# Patient Record
Sex: FEMALE | Race: WHITE | NOT HISPANIC OR LATINO | URBAN - METROPOLITAN AREA
[De-identification: names, ages, dates, MRNs, and addresses within clinical notes are randomized per-mention and may not be internally consistent; named-entity substitution may affect disease eponyms.]

---

## 2020-05-01 ENCOUNTER — INPATIENT (INPATIENT)
Facility: HOSPITAL | Age: 85
LOS: 4 days | Discharge: OTHER INSTITUTION | DRG: 481 | End: 2020-05-06
Attending: HOSPITALIST | Admitting: INTERNAL MEDICINE
Payer: MEDICARE

## 2020-05-01 VITALS
HEIGHT: 63 IN | OXYGEN SATURATION: 98 % | DIASTOLIC BLOOD PRESSURE: 81 MMHG | RESPIRATION RATE: 17 BRPM | SYSTOLIC BLOOD PRESSURE: 164 MMHG | TEMPERATURE: 98 F | WEIGHT: 110.01 LBS | HEART RATE: 88 BPM

## 2020-05-01 DIAGNOSIS — S62.347A NONDISPLACED FRACTURE OF BASE OF FIFTH METACARPAL BONE, LEFT HAND, INITIAL ENCOUNTER FOR CLOSED FRACTURE: ICD-10-CM

## 2020-05-01 DIAGNOSIS — S72.452A DISPLACED SUPRACONDYLAR FRACTURE WITHOUT INTRACONDYLAR EXTENSION OF LOWER END OF LEFT FEMUR, INITIAL ENCOUNTER FOR CLOSED FRACTURE: ICD-10-CM

## 2020-05-01 DIAGNOSIS — Z96.653 PRESENCE OF ARTIFICIAL KNEE JOINT, BILATERAL: Chronic | ICD-10-CM

## 2020-05-01 DIAGNOSIS — M97.9XXA PERIPROSTHETIC FRACTURE AROUND UNSPECIFIED INTERNAL PROSTHETIC JOINT, INITIAL ENCOUNTER: ICD-10-CM

## 2020-05-01 DIAGNOSIS — Z90.49 ACQUIRED ABSENCE OF OTHER SPECIFIED PARTS OF DIGESTIVE TRACT: Chronic | ICD-10-CM

## 2020-05-01 LAB
ABO RH CONFIRMATION: SIGNIFICANT CHANGE UP
ALBUMIN SERPL ELPH-MCNC: 3.5 G/DL — SIGNIFICANT CHANGE UP (ref 3.3–5)
ALP SERPL-CCNC: 63 U/L — SIGNIFICANT CHANGE UP (ref 40–120)
ALT FLD-CCNC: 15 U/L — SIGNIFICANT CHANGE UP (ref 10–45)
ANION GAP SERPL CALC-SCNC: 7 MMOL/L — SIGNIFICANT CHANGE UP (ref 5–17)
APTT BLD: 28.3 SEC — SIGNIFICANT CHANGE UP (ref 27.5–36.3)
AST SERPL-CCNC: 30 U/L — SIGNIFICANT CHANGE UP (ref 10–40)
BASOPHILS # BLD AUTO: 0.03 K/UL — SIGNIFICANT CHANGE UP (ref 0–0.2)
BASOPHILS NFR BLD AUTO: 0.5 % — SIGNIFICANT CHANGE UP (ref 0–2)
BILIRUB SERPL-MCNC: 0.4 MG/DL — SIGNIFICANT CHANGE UP (ref 0.2–1.2)
BLD GP AB SCN SERPL QL: SIGNIFICANT CHANGE UP
BUN SERPL-MCNC: 12 MG/DL — SIGNIFICANT CHANGE UP (ref 7–23)
CALCIUM SERPL-MCNC: 9.2 MG/DL — SIGNIFICANT CHANGE UP (ref 8.4–10.5)
CHLORIDE SERPL-SCNC: 96 MMOL/L — SIGNIFICANT CHANGE UP (ref 96–108)
CO2 SERPL-SCNC: 28 MMOL/L — SIGNIFICANT CHANGE UP (ref 22–31)
CREAT SERPL-MCNC: 0.74 MG/DL — SIGNIFICANT CHANGE UP (ref 0.5–1.3)
EOSINOPHIL # BLD AUTO: 0.07 K/UL — SIGNIFICANT CHANGE UP (ref 0–0.5)
EOSINOPHIL NFR BLD AUTO: 1.1 % — SIGNIFICANT CHANGE UP (ref 0–6)
GLUCOSE SERPL-MCNC: 115 MG/DL — HIGH (ref 70–99)
HCT VFR BLD CALC: 33.7 % — LOW (ref 34.5–45)
HGB BLD-MCNC: 11.5 G/DL — SIGNIFICANT CHANGE UP (ref 11.5–15.5)
IMM GRANULOCYTES NFR BLD AUTO: 0.3 % — SIGNIFICANT CHANGE UP (ref 0–1.5)
INR BLD: 1.01 RATIO — SIGNIFICANT CHANGE UP (ref 0.88–1.16)
LYMPHOCYTES # BLD AUTO: 0.94 K/UL — LOW (ref 1–3.3)
LYMPHOCYTES # BLD AUTO: 14.2 % — SIGNIFICANT CHANGE UP (ref 13–44)
MCHC RBC-ENTMCNC: 32 PG — SIGNIFICANT CHANGE UP (ref 27–34)
MCHC RBC-ENTMCNC: 34.1 GM/DL — SIGNIFICANT CHANGE UP (ref 32–36)
MCV RBC AUTO: 93.9 FL — SIGNIFICANT CHANGE UP (ref 80–100)
MONOCYTES # BLD AUTO: 0.48 K/UL — SIGNIFICANT CHANGE UP (ref 0–0.9)
MONOCYTES NFR BLD AUTO: 7.3 % — SIGNIFICANT CHANGE UP (ref 2–14)
NEUTROPHILS # BLD AUTO: 5.07 K/UL — SIGNIFICANT CHANGE UP (ref 1.8–7.4)
NEUTROPHILS NFR BLD AUTO: 76.6 % — SIGNIFICANT CHANGE UP (ref 43–77)
NRBC # BLD: 0 /100 WBCS — SIGNIFICANT CHANGE UP (ref 0–0)
PLATELET # BLD AUTO: 182 K/UL — SIGNIFICANT CHANGE UP (ref 150–400)
POTASSIUM SERPL-MCNC: 4 MMOL/L — SIGNIFICANT CHANGE UP (ref 3.5–5.3)
POTASSIUM SERPL-SCNC: 4 MMOL/L — SIGNIFICANT CHANGE UP (ref 3.5–5.3)
PROT SERPL-MCNC: 7.3 G/DL — SIGNIFICANT CHANGE UP (ref 6–8.3)
PROTHROM AB SERPL-ACNC: 11.3 SEC — SIGNIFICANT CHANGE UP (ref 10–12.9)
RBC # BLD: 3.59 M/UL — LOW (ref 3.8–5.2)
RBC # FLD: 12.4 % — SIGNIFICANT CHANGE UP (ref 10.3–14.5)
SARS-COV-2 RNA SPEC QL NAA+PROBE: SIGNIFICANT CHANGE UP
SODIUM SERPL-SCNC: 131 MMOL/L — LOW (ref 135–145)
WBC # BLD: 6.61 K/UL — SIGNIFICANT CHANGE UP (ref 3.8–10.5)
WBC # FLD AUTO: 6.61 K/UL — SIGNIFICANT CHANGE UP (ref 3.8–10.5)

## 2020-05-01 PROCEDURE — 73110 X-RAY EXAM OF WRIST: CPT | Mod: 26,LT

## 2020-05-01 PROCEDURE — 27511 TREATMENT OF THIGH FRACTURE: CPT | Mod: LT

## 2020-05-01 PROCEDURE — 27511 TREATMENT OF THIGH FRACTURE: CPT | Mod: AS

## 2020-05-01 PROCEDURE — 27511 TREATMENT OF THIGH FRACTURE: CPT | Mod: 80,LT

## 2020-05-01 PROCEDURE — 20900 REMOVAL OF BONE FOR GRAFT: CPT | Mod: LT

## 2020-05-01 PROCEDURE — 99285 EMERGENCY DEPT VISIT HI MDM: CPT

## 2020-05-01 PROCEDURE — 73552 X-RAY EXAM OF FEMUR 2/>: CPT | Mod: 26,LT,77

## 2020-05-01 PROCEDURE — 29125 APPL SHORT ARM SPLINT STATIC: CPT | Mod: LT

## 2020-05-01 PROCEDURE — 99223 1ST HOSP IP/OBS HIGH 75: CPT

## 2020-05-01 PROCEDURE — 76000 FLUOROSCOPY <1 HR PHYS/QHP: CPT | Mod: 26

## 2020-05-01 PROCEDURE — 73552 X-RAY EXAM OF FEMUR 2/>: CPT | Mod: 26,LT

## 2020-05-01 PROCEDURE — 93010 ELECTROCARDIOGRAM REPORT: CPT

## 2020-05-01 PROCEDURE — 99221 1ST HOSP IP/OBS SF/LOW 40: CPT | Mod: 57,25

## 2020-05-01 PROCEDURE — 71045 X-RAY EXAM CHEST 1 VIEW: CPT | Mod: 26

## 2020-05-01 PROCEDURE — 72170 X-RAY EXAM OF PELVIS: CPT | Mod: 26

## 2020-05-01 PROCEDURE — 73562 X-RAY EXAM OF KNEE 3: CPT | Mod: 26,LT

## 2020-05-01 RX ORDER — PANTOPRAZOLE SODIUM 20 MG/1
40 TABLET, DELAYED RELEASE ORAL
Refills: 0 | Status: DISCONTINUED | OUTPATIENT
Start: 2020-05-01 | End: 2020-05-06

## 2020-05-01 RX ORDER — TAMSULOSIN HYDROCHLORIDE 0.4 MG/1
1 CAPSULE ORAL
Qty: 0 | Refills: 0 | DISCHARGE

## 2020-05-01 RX ORDER — SENNA PLUS 8.6 MG/1
2 TABLET ORAL AT BEDTIME
Refills: 0 | Status: DISCONTINUED | OUTPATIENT
Start: 2020-05-01 | End: 2020-05-01

## 2020-05-01 RX ORDER — ACETAMINOPHEN 500 MG
650 TABLET ORAL EVERY 6 HOURS
Refills: 0 | Status: DISCONTINUED | OUTPATIENT
Start: 2020-05-01 | End: 2020-05-01

## 2020-05-01 RX ORDER — ONDANSETRON 8 MG/1
4 TABLET, FILM COATED ORAL ONCE
Refills: 0 | Status: DISCONTINUED | OUTPATIENT
Start: 2020-05-01 | End: 2020-05-01

## 2020-05-01 RX ORDER — SODIUM CHLORIDE 9 MG/ML
1000 INJECTION, SOLUTION INTRAVENOUS
Refills: 0 | Status: DISCONTINUED | OUTPATIENT
Start: 2020-05-01 | End: 2020-05-02

## 2020-05-01 RX ORDER — CEFAZOLIN SODIUM 1 G
2000 VIAL (EA) INJECTION EVERY 8 HOURS
Refills: 0 | Status: DISCONTINUED | OUTPATIENT
Start: 2020-05-01 | End: 2020-05-01

## 2020-05-01 RX ORDER — SODIUM CHLORIDE 9 MG/ML
250 INJECTION INTRAMUSCULAR; INTRAVENOUS; SUBCUTANEOUS
Refills: 0 | Status: DISCONTINUED | OUTPATIENT
Start: 2020-05-01 | End: 2020-05-02

## 2020-05-01 RX ORDER — CEFAZOLIN SODIUM 1 G
2000 VIAL (EA) INJECTION EVERY 8 HOURS
Refills: 0 | Status: COMPLETED | OUTPATIENT
Start: 2020-05-02 | End: 2020-05-02

## 2020-05-01 RX ORDER — OXYCODONE HYDROCHLORIDE 5 MG/1
5 TABLET ORAL
Refills: 0 | Status: DISCONTINUED | OUTPATIENT
Start: 2020-05-01 | End: 2020-05-06

## 2020-05-01 RX ORDER — ENOXAPARIN SODIUM 100 MG/ML
30 INJECTION SUBCUTANEOUS DAILY
Refills: 0 | Status: DISCONTINUED | OUTPATIENT
Start: 2020-05-02 | End: 2020-05-06

## 2020-05-01 RX ORDER — ACETAMINOPHEN 500 MG
1000 TABLET ORAL ONCE
Refills: 0 | Status: COMPLETED | OUTPATIENT
Start: 2020-05-02 | End: 2020-05-02

## 2020-05-01 RX ORDER — HYDROMORPHONE HYDROCHLORIDE 2 MG/ML
0.25 INJECTION INTRAMUSCULAR; INTRAVENOUS; SUBCUTANEOUS
Refills: 0 | Status: DISCONTINUED | OUTPATIENT
Start: 2020-05-01 | End: 2020-05-06

## 2020-05-01 RX ORDER — LEVOTHYROXINE SODIUM 125 MCG
25 TABLET ORAL DAILY
Refills: 0 | Status: DISCONTINUED | OUTPATIENT
Start: 2020-05-01 | End: 2020-05-01

## 2020-05-01 RX ORDER — HYDROMORPHONE HYDROCHLORIDE 2 MG/ML
0.5 INJECTION INTRAMUSCULAR; INTRAVENOUS; SUBCUTANEOUS
Refills: 0 | Status: DISCONTINUED | OUTPATIENT
Start: 2020-05-01 | End: 2020-05-01

## 2020-05-01 RX ORDER — ONDANSETRON 8 MG/1
4 TABLET, FILM COATED ORAL EVERY 6 HOURS
Refills: 0 | Status: DISCONTINUED | OUTPATIENT
Start: 2020-05-01 | End: 2020-05-06

## 2020-05-01 RX ORDER — LEVOTHYROXINE SODIUM 125 MCG
1 TABLET ORAL
Qty: 0 | Refills: 0 | DISCHARGE

## 2020-05-01 RX ORDER — MORPHINE SULFATE 50 MG/1
2 CAPSULE, EXTENDED RELEASE ORAL ONCE
Refills: 0 | Status: DISCONTINUED | OUTPATIENT
Start: 2020-05-01 | End: 2020-05-01

## 2020-05-01 RX ORDER — HYDROMORPHONE HYDROCHLORIDE 2 MG/ML
0.25 INJECTION INTRAMUSCULAR; INTRAVENOUS; SUBCUTANEOUS
Refills: 0 | Status: DISCONTINUED | OUTPATIENT
Start: 2020-05-01 | End: 2020-05-01

## 2020-05-01 RX ORDER — HEPARIN SODIUM 5000 [USP'U]/ML
5000 INJECTION INTRAVENOUS; SUBCUTANEOUS EVERY 8 HOURS
Refills: 0 | Status: CANCELLED | OUTPATIENT
Start: 2020-05-02 | End: 2020-05-01

## 2020-05-01 RX ORDER — MORPHINE SULFATE 50 MG/1
2 CAPSULE, EXTENDED RELEASE ORAL EVERY 6 HOURS
Refills: 0 | Status: DISCONTINUED | OUTPATIENT
Start: 2020-05-01 | End: 2020-05-01

## 2020-05-01 RX ORDER — PANTOPRAZOLE SODIUM 20 MG/1
40 TABLET, DELAYED RELEASE ORAL
Refills: 0 | Status: DISCONTINUED | OUTPATIENT
Start: 2020-05-01 | End: 2020-05-01

## 2020-05-01 RX ORDER — POLYETHYLENE GLYCOL 3350 17 G/17G
17 POWDER, FOR SOLUTION ORAL DAILY
Refills: 0 | Status: DISCONTINUED | OUTPATIENT
Start: 2020-05-01 | End: 2020-05-06

## 2020-05-01 RX ORDER — SODIUM CHLORIDE 9 MG/ML
1000 INJECTION, SOLUTION INTRAVENOUS
Refills: 0 | Status: DISCONTINUED | OUTPATIENT
Start: 2020-05-01 | End: 2020-05-01

## 2020-05-01 RX ORDER — SENNA PLUS 8.6 MG/1
1 TABLET ORAL AT BEDTIME
Refills: 0 | Status: DISCONTINUED | OUTPATIENT
Start: 2020-05-02 | End: 2020-05-06

## 2020-05-01 RX ORDER — TAMSULOSIN HYDROCHLORIDE 0.4 MG/1
0.4 CAPSULE ORAL AT BEDTIME
Refills: 0 | Status: DISCONTINUED | OUTPATIENT
Start: 2020-05-01 | End: 2020-05-01

## 2020-05-01 RX ADMIN — Medication 25 MICROGRAM(S): at 06:45

## 2020-05-01 RX ADMIN — MORPHINE SULFATE 2 MILLIGRAM(S): 50 CAPSULE, EXTENDED RELEASE ORAL at 03:25

## 2020-05-01 RX ADMIN — SODIUM CHLORIDE 60 MILLILITER(S): 9 INJECTION INTRAMUSCULAR; INTRAVENOUS; SUBCUTANEOUS at 07:57

## 2020-05-01 RX ADMIN — PANTOPRAZOLE SODIUM 40 MILLIGRAM(S): 20 TABLET, DELAYED RELEASE ORAL at 06:46

## 2020-05-01 NOTE — ED PROVIDER NOTE - CARE PLAN
Principal Discharge DX:	Periprosth fracture around unsp internal prosth joint, init  Secondary Diagnosis:	Femur fracture, left

## 2020-05-01 NOTE — H&P ADULT - NSICDXPASTSURGICALHX_GEN_ALL_CORE_FT
PAST SURGICAL HISTORY:  History of cholecystectomy     History of total bilateral knee replacement (TKR)

## 2020-05-01 NOTE — H&P ADULT - NSHPPHYSICALEXAM_GEN_ALL_CORE
Vital Signs Last 24 Hrs  T(C): 36.6 (01 May 2020 02:01), Max: 36.6 (01 May 2020 02:01)  T(F): 97.9 (01 May 2020 02:01), Max: 97.9 (01 May 2020 02:01)  HR: 82 (01 May 2020 04:10) (82 - 88)  BP: 135/76 (01 May 2020 04:10) (135/76 - 164/81)  BP(mean): 90 (01 May 2020 04:10) (90 - 90)  RR: 16 (01 May 2020 04:10) (16 - 17)  SpO2: 98% (01 May 2020 04:10) (98% - 98%)  Daily Height in cm: 160.02 (01 May 2020 02:01)    Daily   CAPILLARY BLOOD GLUCOSE        I&O's Summary      GENERAL: NAD  HEAD:  Normocephalic  EYES: EOMI, PERRLA, conjunctiva and sclera clear  ENMT: No tonsillar erythema, exudates, or enlargement; Moist mucous membranes, No lesions  NECK: Supple, No JVD, no bruit, normal thyroid  NERVOUS SYSTEM:  Alert & Oriented X3, moves all fours except L leg in splint  CHEST/LUNG: Clear to auscultation bilaterally; No rales, rhonchi, wheezing, or rubs  HEART: Regular rate and rhythm; No murmurs, rubs, or gallops  ABDOMEN: Soft, Nontender, mildly distended; Bowel sounds present  EXTREMITIES:  2+ Peripheral Pulses, No clubbing, cyanosis, or edema  LYMPH: No lymphadenopathy noted  SKIN: No rashes or lesions

## 2020-05-01 NOTE — ED ADULT TRIAGE NOTE - CHIEF COMPLAINT QUOTE
Pt presents to the ED s/p trip and fall, negative LOC, negative head injury, denies thinners. Complains of pain to the left knee, swelling noted, pt also complains of pain to the left wrist. Denies sick contacts

## 2020-05-01 NOTE — ED PROVIDER NOTE - OBJECTIVE STATEMENT
pt s/p mechanical fall at home, lives with daughter and son-in-law show brought pt in because of L knee pain and inability to ambulate or get up after fall.  pt denies any head trauma, confirmed by family.

## 2020-05-01 NOTE — BRIEF OPERATIVE NOTE - NSICDXBRIEFPREOP_GEN_ALL_CORE_FT
PRE-OP DIAGNOSIS:  Periprosthetic supracondylar fracture of femur 01-May-2020 20:11:00 left femur Tiana Rivas

## 2020-05-01 NOTE — CONSULT NOTE ADULT - PROBLEM SELECTOR PROBLEM 1
Closed displaced supracondylar fracture of distal end of left femur without intracondylar extension, initial encounter

## 2020-05-01 NOTE — H&P ADULT - ASSESSMENT
89F hx of CAD/NSTEMI, ?IPF/CHF (not formerly dx with either but takes Lasix once a week), hypothyroid, urinary retention pw s/p mechanical fall with distal L femur fx about L prosthetic knee    # L distal femur fx  pain control  dvt/gi proph  t/s  npo  ortho Dr Hernandez aware.   COVID status pending.  Pt at moderate risk for surgery. Pt with no evidence of active MI/CHF. Pt medically optimized for sx. Son  Guru is aware and gives consent for surgery.     #Hypothyroid  cont synthroid. 89F hx of CAD/NSTEMI, ?IPF/CHF (not formerly dx with either but takes Lasix once a week), hypothyroid, urinary retention pw s/p mechanical fall with distal L femur fx about L prosthetic knee    # L distal femur fx  pain control  dvt/gi proph  t/s  npo  ortho Dr Hernandez aware.   COVID status pending.  Pt at moderate risk for surgery. Pt with no evidence of active MI/CHF. Pt medically optimized for sx. Son  Guru is aware and gives consent for surgery.     #Hypothyroid  cont synthroid.     #CHF  takes Lasix once a week. restart after sx.     # Mild hyponatremia.  likely sec to combination of pain, background diuretic use. 89F hx of CAD/NSTEMI, ?IPF/CHF (not formerly dx with either but takes Lasix once a week), hypothyroid, urinary retention pw s/p mechanical fall with distal L femur fx about L prosthetic knee    # L distal femur fx  pain control  dvt/gi proph  t/s  npo  ortho Dr Hernandez aware.   COVID status pending.  Pt at moderate risk for surgery. Pt with no evidence of active MI/CHF. Pt medically optimized for sx. Son  Guru is aware and gives consent for surgery.   FU L wrist film results - wet read base of L 5th MCP fx.    #Hypothyroid  cont synthroid.     #CHF  takes Lasix once a week. restart after sx.     # Mild hyponatremia.  likely sec to combination of pain, background diuretic use.

## 2020-05-01 NOTE — H&P ADULT - HISTORY OF PRESENT ILLNESS
89F hx of CAD/NSTEMI, ?IPF/CHF (not formerly dx with either but takes Lasix once a week), hypothyroid, urinary retention pw s/p mechanical fall. Pt got up from bed and fell. No antecedent sxs of HA, dizziness, SOB, CP. No recent fevers, chills, cough, NVD. Xray with distal femur fx above the L prosthetic knee. 89F hx of CAD/NSTEMI, ?IPF/CHF (not formerly dx with either but takes Lasix once a week), hypothyroid, urinary retention pw s/p mechanical fall. Pt got up from bed and fell. No antecedent sxs of HA, dizziness, SOB, CP. No recent fevers, chills, cough, NVD. Xray with distal femur fx above the L prosthetic knee.     son Dr Garvey [phone]  son-in-law Dr Wilson [phone]

## 2020-05-01 NOTE — CONSULT NOTE ADULT - SUBJECTIVE AND OBJECTIVE BOX
KATRIN TONY      89y Female with hx of CAD/NSTEMI, ?IPF/CHF (not formerly dx with either but takes Lasix once a week), hypothyroid, urinary retention pw s/p mechanical fall. Pt got up from bed and fell. No antecedent sxs of HA, dizziness, SOB, CP. No recent fevers, chills, cough, NVD. Pt complains of left knee and left hand pain.                                                                                                                                PAST MEDICAL HISTORY:  CAD in native artery     Hypothyroid.     PAST SURGICAL HISTORY:  History of cholecystectomy     History of total bilateral knee replacement (TKR) 20 years ago.                                              11.5   6.61  )-----------( 182      ( 01 May 2020 03:05 )             33.7                     05-01    131<L>  |  96  |  12  ----------------------------<  115<H>  4.0   |  28  |  0.74    Ca    9.2      01 May 2020 03:05    TPro  7.3  /  Alb  3.5  /  TBili  0.4  /  DBili  x   /  AST  30  /  ALT  15  /  AlkPhos  63  05-01      Physical Exam :    -   Left hand with skin intact, ecchymosis over the ulnar aspect of the hand with tenderness to palpation over the 5th metacarpal. FROM of fingers with pain.   -   Distal Neurvascular status intact grossly.   -   Warm well perfused; capillary refill <3 seconds   -   Left knee with knee immobilizer in place, moderate swelling about knee and distal thigh, diffuse tenderness to palpation, pain with motion  -   Distal Neurvascular status intact grossly.   -   Warm well perfused; capillary refill <3 seconds   -   (+)EHL/FHL 5/5  -   (+) Sensation to light touch  -   (-) Calf tenderness Bilaterally      EXAM:  WRIST LEFT (MINIMUM 3 VIEWS)      PROCEDURE DATE:  05/01/2020        INTERPRETATION:  Left wrist. Patient had a fall and has local pain. 3 views.    There is very advanced degeneration around the base of the first metacarpal with proximal migration of this bone on the carpus a deformity.    Lesser degeneration is seen elsewhere.    There is a fracture deformity at the base of the shaft of the fifth metacarpal which is age uncertain.    IMPRESSION: Advanced degeneration as above.    Fracture base of shaft of fifth metacarpal. Age uncertain.                   JULIA RUSSELL M.D., ATTENDING RADIOLOGIST  This document has been electronically signed. May  1 2020  8:05AM            EXAM:  KNEE AP LAT&OBL-LEFT      PROCEDURE DATE:  05/01/2020        INTERPRETATION:  Left knee. 4 views. Patient had a fall with local trauma.    COMPARISON: None available.    There is a knee replacement with good basic alignment.    There is a periprosthetic fracture of the lower femoral shaft. There is some local comminution. The main fragment is displaced medially and anteriorly.    IMPRESSION: Periprosthetic fracture as above.                   JULIA RUSSELL M.D., ATTENDING RADIOLOGIST  This document has been electronically signed. May  1 2020  8:10AM

## 2020-05-01 NOTE — ED ADULT NURSE NOTE - OBJECTIVE STATEMENT
Pt is alert, came to the ER via EMS after she roll of her bed as she was trying to get up to go to bathroom. No LOC or nausea or vomiting. Not on anticoagulant. Complained of right knee pain . Pt is alert, came to the ER via EMS after she roll of her bed as she was trying to get up to go to bathroom. No LOC or nausea or vomiting. Not on anticoagulant. Complained of left knee pain and left wrist pain.

## 2020-05-01 NOTE — CONSULT NOTE ADULT - PROBLEM SELECTOR RECOMMENDATION 9
Advised ORIF of the distal femur. Risks benefits and alternative discussed with patient's son who agrees with this plan.  Surgery to be performed along with Dr. Mccallum.

## 2020-05-01 NOTE — BRIEF OPERATIVE NOTE - NSICDXBRIEFPOSTOP_GEN_ALL_CORE_FT
POST-OP DIAGNOSIS:  Periprosthetic supracondylar fracture of femur 01-May-2020 20:11:28 Left femur Tiana Rivas

## 2020-05-01 NOTE — ED PROVIDER NOTE - PHYSICAL EXAMINATION
Gen:  alert, awake, no acute distress  HEENT:  atraumatic head, airway clear, pupils equal and round  CV:  rrr, nl S1, S2, no m/r/g  Pulm:  lungs CTA b/l  Abd: s/nt/nd, +BS  Ext:  moving all extremities except L knee which is held in flexion, distal femur deformity appreciated  Neuro:  grossly intact, no focal deficits  Skin:  clear, dry, intact  Psych: AOx2, normal affect, no apparent risk to self or others

## 2020-05-01 NOTE — BRIEF OPERATIVE NOTE - NSICDXBRIEFPROCEDURE_GEN_ALL_CORE_FT
PROCEDURES:  ORIF, fracture, distal femur, left 01-May-2020 20:12:05 Periprosthetic distal left femur Tiana Rivas

## 2020-05-02 LAB
ALBUMIN SERPL ELPH-MCNC: 2.7 G/DL — LOW (ref 3.3–5)
ALP SERPL-CCNC: 52 U/L — SIGNIFICANT CHANGE UP (ref 40–120)
ALT FLD-CCNC: 9 U/L — LOW (ref 10–45)
ANION GAP SERPL CALC-SCNC: 10 MMOL/L — SIGNIFICANT CHANGE UP (ref 5–17)
AST SERPL-CCNC: 24 U/L — SIGNIFICANT CHANGE UP (ref 10–40)
BASOPHILS # BLD AUTO: 0.02 K/UL — SIGNIFICANT CHANGE UP (ref 0–0.2)
BASOPHILS NFR BLD AUTO: 0.2 % — SIGNIFICANT CHANGE UP (ref 0–2)
BILIRUB SERPL-MCNC: 0.5 MG/DL — SIGNIFICANT CHANGE UP (ref 0.2–1.2)
BUN SERPL-MCNC: 16 MG/DL — SIGNIFICANT CHANGE UP (ref 7–23)
CALCIUM SERPL-MCNC: 8.6 MG/DL — SIGNIFICANT CHANGE UP (ref 8.4–10.5)
CHLORIDE SERPL-SCNC: 99 MMOL/L — SIGNIFICANT CHANGE UP (ref 96–108)
CO2 SERPL-SCNC: 25 MMOL/L — SIGNIFICANT CHANGE UP (ref 22–31)
CREAT SERPL-MCNC: 0.91 MG/DL — SIGNIFICANT CHANGE UP (ref 0.5–1.3)
EOSINOPHIL # BLD AUTO: 0.01 K/UL — SIGNIFICANT CHANGE UP (ref 0–0.5)
EOSINOPHIL NFR BLD AUTO: 0.1 % — SIGNIFICANT CHANGE UP (ref 0–6)
GLUCOSE SERPL-MCNC: 80 MG/DL — SIGNIFICANT CHANGE UP (ref 70–99)
HCT VFR BLD CALC: 26.4 % — LOW (ref 34.5–45)
HGB BLD-MCNC: 8.9 G/DL — LOW (ref 11.5–15.5)
IMM GRANULOCYTES NFR BLD AUTO: 0.4 % — SIGNIFICANT CHANGE UP (ref 0–1.5)
LYMPHOCYTES # BLD AUTO: 1.14 K/UL — SIGNIFICANT CHANGE UP (ref 1–3.3)
LYMPHOCYTES # BLD AUTO: 14.1 % — SIGNIFICANT CHANGE UP (ref 13–44)
MCHC RBC-ENTMCNC: 32.2 PG — SIGNIFICANT CHANGE UP (ref 27–34)
MCHC RBC-ENTMCNC: 33.7 GM/DL — SIGNIFICANT CHANGE UP (ref 32–36)
MCV RBC AUTO: 95.7 FL — SIGNIFICANT CHANGE UP (ref 80–100)
MONOCYTES # BLD AUTO: 0.75 K/UL — SIGNIFICANT CHANGE UP (ref 0–0.9)
MONOCYTES NFR BLD AUTO: 9.3 % — SIGNIFICANT CHANGE UP (ref 2–14)
NEUTROPHILS # BLD AUTO: 6.13 K/UL — SIGNIFICANT CHANGE UP (ref 1.8–7.4)
NEUTROPHILS NFR BLD AUTO: 75.9 % — SIGNIFICANT CHANGE UP (ref 43–77)
NRBC # BLD: 0 /100 WBCS — SIGNIFICANT CHANGE UP (ref 0–0)
PLATELET # BLD AUTO: 147 K/UL — LOW (ref 150–400)
POTASSIUM SERPL-MCNC: 3.8 MMOL/L — SIGNIFICANT CHANGE UP (ref 3.5–5.3)
POTASSIUM SERPL-SCNC: 3.8 MMOL/L — SIGNIFICANT CHANGE UP (ref 3.5–5.3)
PROT SERPL-MCNC: 5.8 G/DL — LOW (ref 6–8.3)
RBC # BLD: 2.76 M/UL — LOW (ref 3.8–5.2)
RBC # FLD: 12.8 % — SIGNIFICANT CHANGE UP (ref 10.3–14.5)
SODIUM SERPL-SCNC: 134 MMOL/L — LOW (ref 135–145)
WBC # BLD: 8.08 K/UL — SIGNIFICANT CHANGE UP (ref 3.8–10.5)
WBC # FLD AUTO: 8.08 K/UL — SIGNIFICANT CHANGE UP (ref 3.8–10.5)

## 2020-05-02 PROCEDURE — 99233 SBSQ HOSP IP/OBS HIGH 50: CPT

## 2020-05-02 RX ORDER — LEVOTHYROXINE SODIUM 125 MCG
25 TABLET ORAL DAILY
Refills: 0 | Status: DISCONTINUED | OUTPATIENT
Start: 2020-05-02 | End: 2020-05-06

## 2020-05-02 RX ORDER — TAMSULOSIN HYDROCHLORIDE 0.4 MG/1
0.4 CAPSULE ORAL AT BEDTIME
Refills: 0 | Status: DISCONTINUED | OUTPATIENT
Start: 2020-05-02 | End: 2020-05-06

## 2020-05-02 RX ORDER — SODIUM CHLORIDE 9 MG/ML
1000 INJECTION, SOLUTION INTRAVENOUS
Refills: 0 | Status: DISCONTINUED | OUTPATIENT
Start: 2020-05-02 | End: 2020-05-03

## 2020-05-02 RX ADMIN — ENOXAPARIN SODIUM 30 MILLIGRAM(S): 100 INJECTION SUBCUTANEOUS at 12:38

## 2020-05-02 RX ADMIN — TAMSULOSIN HYDROCHLORIDE 0.4 MILLIGRAM(S): 0.4 CAPSULE ORAL at 19:59

## 2020-05-02 RX ADMIN — Medication 100 MILLIGRAM(S): at 09:19

## 2020-05-02 RX ADMIN — SENNA PLUS 1 TABLET(S): 8.6 TABLET ORAL at 20:00

## 2020-05-02 RX ADMIN — Medication 100 MILLIGRAM(S): at 01:53

## 2020-05-02 RX ADMIN — Medication 400 MILLIGRAM(S): at 00:56

## 2020-05-02 NOTE — DIETITIAN INITIAL EVALUATION ADULT. - DIET TYPE
dysphagia 2, mechanical soft, thin liquids/Ensure Enlive 8oz BId (350kcals, 20g protein per serving)

## 2020-05-02 NOTE — PHYSICAL THERAPY INITIAL EVALUATION ADULT - RANGE OF MOTION EXAMINATION, REHAB EVAL
left LE hip and ankle WFL, knee Not assessed/no ROM deficits were identified/deficits as listed below

## 2020-05-02 NOTE — DIETITIAN INITIAL EVALUATION ADULT. - ADD RECOMMEND
Provide feeding assistance. If able to obtain pt's dentures, then upgrade diet back to regular texture, but maintain Ensure Enlive BID.

## 2020-05-02 NOTE — CHART NOTE - NSCHARTNOTEFT_GEN_A_CORE
Upon Nutritional Assessment by the Registered Dietitian your patient was determined to meet criteria / has evidence of the following diagnosis/diagnoses:          [ ]  Mild Protein Calorie Malnutrition        [X]  Moderate Protein Calorie Malnutrition        [ ] Severe Protein Calorie Malnutrition        [ ] Unspecified Protein Calorie Malnutrition        [ ] Underweight / BMI <19        [ ] Morbid Obesity / BMI > 40      Findings as based on:  [x] Comprehensive nutrition assessment   [X] Nutrition Focused Physical Exam: moderate muscle wasting/fat loss (temporal, clavicle, shoulders, orbital, buccal, triceps)  [ ] Other:       Nutrition Plan/Recommendations:    1. Downgrade diet to mechanical soft (pt with no dentures) + add Ensure Enlive 8oz BID   2. Resume regular texture diet if pt's family brings dentures      PROVIDER Section:     By signing this assessment you are acknowledging and agree with the diagnosis/diagnoses assigned by the Registered Dietitian    Comments:

## 2020-05-02 NOTE — PROGRESS NOTE ADULT - ASSESSMENT
Stable post op  anemia of acute blood loss   hypothyroid   CAD     Plan:  NWB Left leg , Wheelchair transfers as per PT            Keep Immobilizer in place           Lovenox for DVT prophylaxis            Pain management            check labs in Am

## 2020-05-02 NOTE — PHYSICAL THERAPY INITIAL EVALUATION ADULT - GENERAL OBSERVATIONS, REHAB EVAL
patient received supine in bed, NAD +garcia +IV +monitors +left knee immobilizer, son at bedside to translate

## 2020-05-02 NOTE — PROGRESS NOTE ADULT - SUBJECTIVE AND OBJECTIVE BOX
S/P ORIF Left distal femur periprosthetic fx   POD #1    Patient was seen by me this am , along with Dr Hernandez .     Patient is resting comfortably in bed , her son is by her side.     Patient c/o of moderate surgical pain but is comfortable if not moving. Patient denies CP or SOB.    Vital Signs Last 24 Hrs  T(C): 36.4 (02 May 2020 08:07), Max: 36.4 (01 May 2020 19:54)  T(F): 97.6 (02 May 2020 08:07), Max: 97.6 (02 May 2020 04:44)  HR: 84 (02 May 2020 13:22) (79 - 89)  BP: 98/68 (02 May 2020 13:22) (93/69 - 133/75)  BP(mean): --  05-02    134<L>  |  99  |  16  ----------------------------<  80  3.8   |  25  |  0.91    Ca    8.6      02 May 2020 09:00    TPro  5.8<L>  /  Alb  2.7<L>  /  TBili  0.5  /  DBili  x   /  AST  24  /  ALT  9<L>  /  AlkPhos  52  05-02  RR: 12 (02 May 2020 13:22) (12 - 18)  SpO2: 98% (02 May 2020 13:22) (96% - 100%)    PAST MEDICAL & SURGICAL HISTORY:  CAD in native artery  Hypothyroid  History of cholecystectomy  History of total bilateral knee replacement (TKR)    MEDICATIONS  (STANDING):  enoxaparin Injectable 30 milliGRAM(s) SubCutaneous daily  pantoprazole    Tablet 40 milliGRAM(s) Oral before breakfast  senna 1 Tablet(s) Oral at bedtime  sodium chloride 0.9%. 250 milliLiter(s) (60 mL/Hr) IV Continuous <Continuous>    MEDICATIONS  (PRN):  HYDROmorphone  Injectable 0.25 milliGRAM(s) IV Push every 3 hours PRN Severe Pain (7 - 10)  ondansetron Injectable 4 milliGRAM(s) IV Push every 6 hours PRN Nausea and/or Vomiting  oxyCODONE    IR 5 milliGRAM(s) Oral every 3 hours PRN Moderate Pain (4 - 6)  polyethylene glycol 3350 17 Gram(s) Oral daily PRN Constipation     PE:  Alert 89 year old female in NAD   Lungs:  CTA   Cor :  RR @ 84   Abd:  soft, non tender, tolerating clear liquids this am.   Voiding with garcia  in place.    Ext:  Left leg in Knee immobilizer ,  Dressings clean, dry and intact.   Left foot warm, good capillary refill   , 3 seconds. + 4 Pulses DP/PT .     SCD 's bilateral while in bed                           8.9    8.08  )-----------( 147      ( 02 May 2020 09:00 )             26.4   05-02    134<L>  |  99  |  16  ----------------------------<  80  3.8   |  25  |  0.91    Ca    8.6      02 May 2020 09:00    TPro  5.8<L>  /  Alb  2.7<L>  /  TBili  0.5  /  DBili  x   /  AST  24  /  ALT  9<L>  /  AlkPhos  52  05-02

## 2020-05-02 NOTE — PROGRESS NOTE ADULT - ASSESSMENT
89 female with hx of cad/nstemi, ?ipf/chf (not formerly diagnosed with either but takes lasix once a week), hypothyroid, urinary retention presents s/p mechanical fall found with left distal femur fracture     # Periprosthetic supracondylar fracture left femur s/p orif pod # 1  ortho following - left distal femur fx s/p orif pod # 1  appears stable - pain management, pt evaluation, tolerating diet  follow up ortho recs    # Closed non displaced fx of fifth metacarpal bone of left hand  conservative management, volar splint, rest, ice, elevation    # Hypothyroid   continue synthroid    # DVT ppx  renally dosed enoxaparin 89 female with hx of cad/nstemi, ?ipf/chf (not formerly diagnosed with either but takes lasix once a week), hypothyroid, urinary retention presents s/p mechanical fall found with left distal femur fracture     # Periprosthetic supracondylar fracture left femur s/p orif pod # 1  ortho following - left distal femur fx s/p orif pod # 1  appears stable - pain management, pt evaluation, tolerating diet  follow up ortho recs    # Closed non displaced fx of fifth metacarpal bone of left hand  conservative management, volar splint, rest, ice, elevation    # Acute blood loss anemia  cbc 8.9 today from 11.5 yesterday, likely post op blood loss  plan to monitor    # Hypothyroid   continue synthroid    # DVT ppx  renally dosed enoxaparin

## 2020-05-02 NOTE — PHYSICAL THERAPY INITIAL EVALUATION ADULT - ADDITIONAL COMMENTS
as per son, patient lives in private home, 2 steps to enter, with her daughter and son-in-law, she ambulates with a RW, occasionally used a wc, had a home health aide for bathing until about a month or so ago due to covid, she required assist with ADLs, son would like for patient to get rehab before returning home

## 2020-05-02 NOTE — DIETITIAN INITIAL EVALUATION ADULT. - OTHER INFO
89F hx of CAD/NSTEMI, ?IPF/CHF (not formerly dx with either but takes Lasix once a week), hypothyroid, urinary retention pw s/p mechanical fall with distal L femur fx about L prosthetic knee. Now s/p ORIF POD #1.   Pt currently receiving DASH/TLC diet however noted missing dentition with no dentures in sight- family may have taken home prior to surgery per RN. Would suggest downgrade diet to mechanical soft until able to obtain dentures. Unable to obtain weight history from pt at this time. Based on physical assessment, pt appears to have moderate muscle wasting (temporal, clavicle, shoulders) and fat loss (orbital, buccal, triceps), could be partially attributed to age-related losses. +1 generalized edema noted, no pressure ulcers documented- surgical incision to left hip. NKFA per chart review.

## 2020-05-02 NOTE — PHYSICAL THERAPY INITIAL EVALUATION ADULT - PERTINENT HX OF CURRENT PROBLEM, REHAB EVAL
patient s/p fall at home +left periproesthtic knee fx, s/p left ORIF 5/1, +fracture at shaft of 5th metacarpal-conservative management recommended

## 2020-05-02 NOTE — PHYSICAL THERAPY INITIAL EVALUATION ADULT - MD ORDER
s/p left periprosthetic knee fracture s/p ORIF left femur  NWB left LE, knee immobilizer, allow gentle knee flexion POD 2

## 2020-05-02 NOTE — PROGRESS NOTE ADULT - SUBJECTIVE AND OBJECTIVE BOX
Patient is a 89y old  Female who presents with a chief complaint of s/p L periprosthetic knee fx distal femur. (01 May 2020 20:02)    Patient seen and examined at bedside.  No acute events overnight    ALLERGIES:  No Known Allergies        Vital Signs Last 24 Hrs  T(F): 97.6 (02 May 2020 08:07), Max: 97.6 (02 May 2020 04:44)  HR: 89 (02 May 2020 08:07) (79 - 89)  BP: 108/68 (02 May 2020 08:07) (93/69 - 133/75)  RR: 12 (02 May 2020 08:07) (12 - 18)  SpO2: 98% (02 May 2020 08:07) (96% - 100%)  I&O's Summary    01 May 2020 07:01  -  02 May 2020 07:00  --------------------------------------------------------  IN: 90 mL / OUT: 310 mL / NET: -220 mL      MEDICATIONS:  enoxaparin Injectable 30 milliGRAM(s) SubCutaneous daily  HYDROmorphone  Injectable 0.25 milliGRAM(s) IV Push every 3 hours PRN  lactated ringers. 1000 milliLiter(s) IV Continuous <Continuous>  ondansetron Injectable 4 milliGRAM(s) IV Push every 6 hours PRN  oxyCODONE    IR 5 milliGRAM(s) Oral every 3 hours PRN  pantoprazole    Tablet 40 milliGRAM(s) Oral before breakfast  polyethylene glycol 3350 17 Gram(s) Oral daily PRN  senna 1 Tablet(s) Oral at bedtime  sodium chloride 0.9%. 250 milliLiter(s) IV Continuous <Continuous>      PHYSICAL EXAM:  General: NAD  ENT: MMM  Neck: Supple, No JVD  Lungs: Clear to auscultation bilaterally, non labored breathing  Cardio: S1S2 regular  Abdomen: Soft, Nontender  Extremities: No cyanosis, No edema    LABS:                        8.9    8.08  )-----------( 147      ( 02 May 2020 09:00 )             26.4     05-02    134  |  99  |  16  ----------------------------<  80  3.8   |  25  |  0.91    Ca    8.6      02 May 2020 09:00    TPro  5.8  /  Alb  2.7  /  TBili  0.5  /  DBili  x   /  AST  24  /  ALT  9   /  AlkPhos  52  05-02    eGFR if : 61 mL/min/1.73M2 (05-02-20 @ 09:00)  eGFR if Non African American: 53 mL/min/1.73M2 (05-02-20 @ 09:00)    PT/INR - ( 01 May 2020 08:15 )   PT: 11.3 sec;   INR: 1.01 ratio         PTT - ( 01 May 2020 08:15 )  PTT:28.3 sec              RADIOLOGY & ADDITIONAL TESTS:  < from: Xray Femur 2 Views, Left (05.01.20 @ 21:25) >  INTERPRETATION:  Left femur and fluoroscopy    HISTORY: Postop    Comparison: Earlier the same day     Three views of the left femur show placement of surgical plate and screws along the distal femur with reduction of the distal femoral fracture.    Fluoroscopy was utilized to assist in hardware placement.     IMPRESSION: Postoperative changes.    Thank you for this referral.                  URIAH DRAKE M.D., ATTENDING RADIOLOGIST  This document has been electronically signed. May  2 2020  7:05AM    < end of copied text >        Care Discussed with Consultants/Other Providers:

## 2020-05-03 LAB
ANION GAP SERPL CALC-SCNC: 6 MMOL/L — SIGNIFICANT CHANGE UP (ref 5–17)
BUN SERPL-MCNC: 17 MG/DL — SIGNIFICANT CHANGE UP (ref 7–23)
CALCIUM SERPL-MCNC: 8.2 MG/DL — LOW (ref 8.4–10.5)
CHLORIDE SERPL-SCNC: 100 MMOL/L — SIGNIFICANT CHANGE UP (ref 96–108)
CO2 SERPL-SCNC: 28 MMOL/L — SIGNIFICANT CHANGE UP (ref 22–31)
CREAT SERPL-MCNC: 0.78 MG/DL — SIGNIFICANT CHANGE UP (ref 0.5–1.3)
GLUCOSE SERPL-MCNC: 101 MG/DL — HIGH (ref 70–99)
HCT VFR BLD CALC: 24.3 % — LOW (ref 34.5–45)
HGB BLD-MCNC: 8.3 G/DL — LOW (ref 11.5–15.5)
MCHC RBC-ENTMCNC: 32.8 PG — SIGNIFICANT CHANGE UP (ref 27–34)
MCHC RBC-ENTMCNC: 34.2 GM/DL — SIGNIFICANT CHANGE UP (ref 32–36)
MCV RBC AUTO: 96 FL — SIGNIFICANT CHANGE UP (ref 80–100)
NRBC # BLD: 0 /100 WBCS — SIGNIFICANT CHANGE UP (ref 0–0)
PLATELET # BLD AUTO: 135 K/UL — LOW (ref 150–400)
POTASSIUM SERPL-MCNC: 3.6 MMOL/L — SIGNIFICANT CHANGE UP (ref 3.5–5.3)
POTASSIUM SERPL-SCNC: 3.6 MMOL/L — SIGNIFICANT CHANGE UP (ref 3.5–5.3)
RBC # BLD: 2.53 M/UL — LOW (ref 3.8–5.2)
RBC # FLD: 13.1 % — SIGNIFICANT CHANGE UP (ref 10.3–14.5)
SODIUM SERPL-SCNC: 134 MMOL/L — LOW (ref 135–145)
WBC # BLD: 8.49 K/UL — SIGNIFICANT CHANGE UP (ref 3.8–10.5)
WBC # FLD AUTO: 8.49 K/UL — SIGNIFICANT CHANGE UP (ref 3.8–10.5)

## 2020-05-03 PROCEDURE — 99233 SBSQ HOSP IP/OBS HIGH 50: CPT

## 2020-05-03 RX ORDER — PSYLLIUM SEED (WITH DEXTROSE)
1 POWDER (GRAM) ORAL DAILY
Refills: 0 | Status: DISCONTINUED | OUTPATIENT
Start: 2020-05-03 | End: 2020-05-03

## 2020-05-03 RX ORDER — ACETAMINOPHEN 500 MG
650 TABLET ORAL EVERY 6 HOURS
Refills: 0 | Status: DISCONTINUED | OUTPATIENT
Start: 2020-05-03 | End: 2020-05-06

## 2020-05-03 RX ADMIN — SENNA PLUS 1 TABLET(S): 8.6 TABLET ORAL at 21:29

## 2020-05-03 RX ADMIN — SODIUM CHLORIDE 75 MILLILITER(S): 9 INJECTION, SOLUTION INTRAVENOUS at 04:57

## 2020-05-03 RX ADMIN — Medication 25 MICROGRAM(S): at 04:57

## 2020-05-03 RX ADMIN — TAMSULOSIN HYDROCHLORIDE 0.4 MILLIGRAM(S): 0.4 CAPSULE ORAL at 21:29

## 2020-05-03 RX ADMIN — ENOXAPARIN SODIUM 30 MILLIGRAM(S): 100 INJECTION SUBCUTANEOUS at 11:27

## 2020-05-03 RX ADMIN — PANTOPRAZOLE SODIUM 40 MILLIGRAM(S): 20 TABLET, DELAYED RELEASE ORAL at 04:57

## 2020-05-03 RX ADMIN — OXYCODONE HYDROCHLORIDE 5 MILLIGRAM(S): 5 TABLET ORAL at 04:57

## 2020-05-03 NOTE — PROVIDER CONTACT NOTE (OTHER) - SITUATION
Notified patient is runnin couplets of PVCs and PACs on the monitor. Family request DNR/DNI order. Placed in chart

## 2020-05-03 NOTE — PROGRESS NOTE ADULT - ASSESSMENT
89 female with hx of cad/nstemi, ?ipf/chf (not formerly diagnosed with either but takes lasix once a week), hypothyroid, urinary retention presents s/p mechanical fall found with left distal femur fracture     # Periprosthetic supracondylar fracture left femur s/p orif pod # 2  ortho following - left distal femur fx s/p orif pod # 2  appears stable - pain management, pt evaluation, tolerating diet, bowel regiment  follow up ortho recs    # Closed non displaced fx of fifth metacarpal bone of left hand  conservative management, volar splint, rest, ice, elevation    # Acute blood loss anemia  likely post op blood loss  continue to monitor cbc    # Hypothyroid   continue synthroid    # DVT ppx  renally dosed enoxaparin    # Discharge planning  PT recommends rehab when medically cleared

## 2020-05-03 NOTE — PROVIDER CONTACT NOTE (OTHER) - SITUATION
Pt was SC this morning at 5 AM 500cc. Has not voided yet. Bladder scan at noon shows residual of 316 ml. Furthermore, patient and son would like to DC fluids.

## 2020-05-03 NOTE — PROGRESS NOTE ADULT - ASSESSMENT
Ortho stable   anemia of acute blood loss presently asymptomatic   urinary retention   constipation   CAD, OA, hypothyroid     Plan: NWB Left leg , wheelchair transfers only           Lovenox for DVT prophylaxis          incentive spirometer           Will consider transfusion in am if HGB < than 8 - patient denies any symptoms today           As per medicine           check labs in am           Straight cath if bladder scan shows > than 500 , continue external cath for now.           Senna, miralax for constipation

## 2020-05-03 NOTE — PROGRESS NOTE ADULT - SUBJECTIVE AND OBJECTIVE BOX
S/P ORIF left periprosthetic fracture distal femur   POD #2    Patient was seen and examined by me this am.    There were no acute events noted overnight except that   patient did have urinary retention.  She required straight cath x 1.  Patient has little comprehension of English but   her son is by her side daily.  She denies any dizziness, SOB or CP.    Vital Signs Last 24 Hrs  T(C): 36.7 (03 May 2020 04:59), Max: 36.9 (02 May 2020 15:53)  T(F): 98 (03 May 2020 04:59), Max: 98.5 (02 May 2020 15:53)  HR: 95 (03 May 2020 12:05) (81 - 98)  BP: 104/62 (03 May 2020 08:27) (90/64 - 121/83)  BP(mean): --  RR: 26 (03 May 2020 12:05) (11 - 26)  SpO2: 99% (03 May 2020 12:05) (98% - 100%)    PAST MEDICAL & SURGICAL HISTORY:  CAD in native artery  Hypothyroid  History of cholecystectomy  History of total bilateral knee replacement (TKR)    MEDICATIONS  (STANDING):  enoxaparin Injectable 30 milliGRAM(s) SubCutaneous daily  lactated ringers. 1000 milliLiter(s) (75 mL/Hr) IV Continuous <Continuous>  levothyroxine 25 MICROGram(s) Oral daily  pantoprazole    Tablet 40 milliGRAM(s) Oral before breakfast  senna 1 Tablet(s) Oral at bedtime  tamsulosin 0.4 milliGRAM(s) Oral at bedtime    MEDICATIONS  (PRN):  HYDROmorphone  Injectable 0.25 milliGRAM(s) IV Push every 3 hours PRN Severe Pain (7 - 10)  ondansetron Injectable 4 milliGRAM(s) IV Push every 6 hours PRN Nausea and/or Vomiting  oxyCODONE    IR 5 milliGRAM(s) Oral every 3 hours PRN Moderate Pain (4 - 6)  polyethylene glycol 3350 17 Gram(s) Oral daily PRN Constipation    PE:   Alert 89 year old female in NAD with son by her side.   Lungs:  CTA   Cor:  Tachy at 95 but asymptomatic   Abd:  soft non tender + BS -BM , external urinary catheter in place.   Ext:  Left hand in splint 2/2 5th base metacarpal fracture , fingers slightly edematous but good ROM         fingers warm , good capillary refill          Left leg in immobilizer , Dressings clean dry and intact.   Will remove dressings in am          patient may begin gentle flexing once dressings changed.            Foot warm, good pedal pulses +4 , Good capillary refill < than 3 seconds  SCD 's bilateral                           8.3    8.49  )-----------( 135      ( 03 May 2020 09:38 )             24.3   05-03    134<L>  |  100  |  17  ----------------------------<  101<H>  3.6   |  28  |  0.78    Ca    8.2<L>      03 May 2020 09:38    TPro  5.8<L>  /  Alb  2.7<L>  /  TBili  0.5  /  DBili  x   /  AST  24  /  ALT  9<L>  /  AlkPhos  52  05-02

## 2020-05-03 NOTE — PROVIDER CONTACT NOTE (OTHER) - SITUATION
patient was previously on garcia catheter that was d/c at 1500 5-2-20, and was placed on primafit. no urine output since garcia was d/c.

## 2020-05-03 NOTE — PROGRESS NOTE ADULT - SUBJECTIVE AND OBJECTIVE BOX
Patient is a 89y old  Female who presents with a chief complaint of s/p L periprosthetic knee fx distal femur. (02 May 2020 13:45)    Patient seen and examined at bedside.  no acute events overnight    ALLERGIES:  No Known Allergies        Vital Signs Last 24 Hrs  T(F): 98 (03 May 2020 04:59), Max: 98.5 (02 May 2020 15:53)  HR: 81 (03 May 2020 08:27) (81 - 98)  BP: 104/62 (03 May 2020 08:27) (90/64 - 121/83)  RR: 11 (03 May 2020 08:27) (11 - 20)  SpO2: 99% (03 May 2020 08:27) (98% - 100%)  I&O's Summary    02 May 2020 07:01  -  03 May 2020 07:00  --------------------------------------------------------  IN: 900 mL / OUT: 800 mL / NET: 100 mL      MEDICATIONS:  enoxaparin Injectable 30 milliGRAM(s) SubCutaneous daily  HYDROmorphone  Injectable 0.25 milliGRAM(s) IV Push every 3 hours PRN  lactated ringers. 1000 milliLiter(s) IV Continuous <Continuous>  levothyroxine 25 MICROGram(s) Oral daily  ondansetron Injectable 4 milliGRAM(s) IV Push every 6 hours PRN  oxyCODONE    IR 5 milliGRAM(s) Oral every 3 hours PRN  pantoprazole    Tablet 40 milliGRAM(s) Oral before breakfast  polyethylene glycol 3350 17 Gram(s) Oral daily PRN  psyllium Powder 1 Packet(s) Oral daily PRN  senna 1 Tablet(s) Oral at bedtime  tamsulosin 0.4 milliGRAM(s) Oral at bedtime      PHYSICAL EXAM:  General: NAD  ENT: MMM  Neck: Supple, No JVD  Lungs: Clear to auscultation bilaterally, non labored breathing  Cardio: S1S2 regular  Abdomen: Soft, Nontender  Extremities: No cyanosis, No edema, L leg knee immobilizer, Dressing cdi    LABS:                        8.9    8.08  )-----------( 147      ( 02 May 2020 09:00 )             26.4     05-02    134  |  99  |  16  ----------------------------<  80  3.8   |  25  |  0.91    Ca    8.6      02 May 2020 09:00    TPro  5.8  /  Alb  2.7  /  TBili  0.5  /  DBili  x   /  AST  24  /  ALT  9   /  AlkPhos  52  05-02    eGFR if : 61 mL/min/1.73M2 (05-02-20 @ 09:00)  eGFR if Non African American: 53 mL/min/1.73M2 (05-02-20 @ 09:00)    PT/INR - ( 01 May 2020 08:15 )   PT: 11.3 sec;   INR: 1.01 ratio         PTT - ( 01 May 2020 08:15 )  PTT:28.3 sec                                RADIOLOGY & ADDITIONAL TESTS:    Care Discussed with Consultants/Other Providers:

## 2020-05-04 LAB
ANION GAP SERPL CALC-SCNC: 8 MMOL/L — SIGNIFICANT CHANGE UP (ref 5–17)
BUN SERPL-MCNC: 11 MG/DL — SIGNIFICANT CHANGE UP (ref 7–23)
CALCIUM SERPL-MCNC: 8.1 MG/DL — LOW (ref 8.4–10.5)
CHLORIDE SERPL-SCNC: 97 MMOL/L — SIGNIFICANT CHANGE UP (ref 96–108)
CO2 SERPL-SCNC: 25 MMOL/L — SIGNIFICANT CHANGE UP (ref 22–31)
CREAT SERPL-MCNC: 0.61 MG/DL — SIGNIFICANT CHANGE UP (ref 0.5–1.3)
GLUCOSE SERPL-MCNC: 124 MG/DL — HIGH (ref 70–99)
HCT VFR BLD CALC: 23.8 % — LOW (ref 34.5–45)
HGB BLD-MCNC: 8.2 G/DL — LOW (ref 11.5–15.5)
MCHC RBC-ENTMCNC: 32.8 PG — SIGNIFICANT CHANGE UP (ref 27–34)
MCHC RBC-ENTMCNC: 34.5 GM/DL — SIGNIFICANT CHANGE UP (ref 32–36)
MCV RBC AUTO: 95.2 FL — SIGNIFICANT CHANGE UP (ref 80–100)
NRBC # BLD: 0 /100 WBCS — SIGNIFICANT CHANGE UP (ref 0–0)
PLATELET # BLD AUTO: 131 K/UL — LOW (ref 150–400)
POTASSIUM SERPL-MCNC: 3.5 MMOL/L — SIGNIFICANT CHANGE UP (ref 3.5–5.3)
POTASSIUM SERPL-SCNC: 3.5 MMOL/L — SIGNIFICANT CHANGE UP (ref 3.5–5.3)
RBC # BLD: 2.5 M/UL — LOW (ref 3.8–5.2)
RBC # FLD: 12.7 % — SIGNIFICANT CHANGE UP (ref 10.3–14.5)
SODIUM SERPL-SCNC: 130 MMOL/L — LOW (ref 135–145)
WBC # BLD: 7.96 K/UL — SIGNIFICANT CHANGE UP (ref 3.8–10.5)
WBC # FLD AUTO: 7.96 K/UL — SIGNIFICANT CHANGE UP (ref 3.8–10.5)

## 2020-05-04 RX ORDER — SIMETHICONE 80 MG/1
80 TABLET, CHEWABLE ORAL ONCE
Refills: 0 | Status: COMPLETED | OUTPATIENT
Start: 2020-05-04 | End: 2020-05-04

## 2020-05-04 RX ORDER — PSYLLIUM SEED (WITH DEXTROSE)
1 POWDER (GRAM) ORAL DAILY
Refills: 0 | Status: DISCONTINUED | OUTPATIENT
Start: 2020-05-04 | End: 2020-05-06

## 2020-05-04 RX ADMIN — SIMETHICONE 80 MILLIGRAM(S): 80 TABLET, CHEWABLE ORAL at 21:01

## 2020-05-04 RX ADMIN — OXYCODONE HYDROCHLORIDE 5 MILLIGRAM(S): 5 TABLET ORAL at 09:34

## 2020-05-04 RX ADMIN — PANTOPRAZOLE SODIUM 40 MILLIGRAM(S): 20 TABLET, DELAYED RELEASE ORAL at 06:23

## 2020-05-04 RX ADMIN — Medication 650 MILLIGRAM(S): at 23:53

## 2020-05-04 RX ADMIN — SENNA PLUS 1 TABLET(S): 8.6 TABLET ORAL at 21:01

## 2020-05-04 RX ADMIN — TAMSULOSIN HYDROCHLORIDE 0.4 MILLIGRAM(S): 0.4 CAPSULE ORAL at 21:01

## 2020-05-04 RX ADMIN — Medication 25 MICROGRAM(S): at 06:23

## 2020-05-04 RX ADMIN — ENOXAPARIN SODIUM 30 MILLIGRAM(S): 100 INJECTION SUBCUTANEOUS at 14:42

## 2020-05-04 NOTE — PROGRESS NOTE ADULT - ASSESSMENT
89 female with hx of cad/nstemi, ?ipf/chf (not formerly diagnosed with either but takes lasix once a week), hypothyroid, urinary retention presents s/p mechanical fall found with left distal femur fracture     Required 3 straight caths last night. Will allow for one more void trial, if failed, Swain will be placed.   Talked with daughters and sons. They are requesting     # Periprosthetic supracondylar fracture left femur s/p orif pod # 3  ortho following - left distal femur fx s/p orif pod # 3  appears stable - pain management, pt evaluation, tolerating diet, minimal BM since surgery, add Miralax  Mobilize with PT  Commode for bathroom. Family state patient normally must sit on toilet for 20 min to urinate.    follow up ortho recs    # Closed non displaced fx of fifth metacarpal bone of left hand  conservative management, volar splint, rest, ice, elevation    # Acute blood loss anemia  likely post op blood loss hgb 11.5, 8.9, 8.3, 8.2  intermittent tachycardia noted  Will transfuse if below 8 and symptomatic  continue to monitor cbc    # Hypothyroid   continue synthroid    # DVT ppx  renally dosed enoxaparin will continue as per surgery    # Discharge planning  PT recommends rehab when medically cleared

## 2020-05-04 NOTE — PROGRESS NOTE ADULT - ASSESSMENT
#1 s/p left distal femur periprosth fx/ORIF POD #3     s/p left hand 5th metacarpal fx (non-surgical)     NWB LLE, dressing change later today, will allow gentle ROM left knee     Mobilize w PT as possible     Lovenox 30mg sq daily for DVT proph    #2 Anemia-acute blood loss      monitor signs/sxs, may need PRBC tx      D/W Medicine    #3 hyponatremia       encourage po intake (DASH)       monitor off IV fluids    #4 urinary retention       would insert garcia with next st cath if no spontaneous void and volume >400       continue Flomax, TOV when more mobile, bowels regulated       avoid narcs as possible    #5 Hypothyroid      c/w Synthroid    #6 CAD stable #1 s/p left distal femur periprosth fx/ORIF POD #3     s/p left hand 5th metacarpal fx (non-surgical)     NWB LLE, dressing change later today, will allow gentle ROM left knee     Mobilize w PT as possible     Lovenox 30mg sq daily for DVT proph    #2 Anemia-acute blood loss      monitor signs/sxs, may need PRBC tx, d/w Medicine      downward trend plats, observe on Lovenox    #3 hyponatremia       encourage po intake (DASH)       monitor off IV fluids    #4 urinary retention       would insert garcia with next st cath if no spontaneous void and volume >400       continue Flomax, TOV when more mobile, bowels regulated       avoid narcs as possible    #5 Hypothyroid      c/w Synthroid    #6 CAD stable #1 s/p left distal femur periprosth fx/ORIF POD #3     s/p left hand 5th metacarpal fx (non-surgical)     NWB LLE, dressing change later today, will allow gentle ROM left knee     Mobilize w PT as possible     Lovenox 30mg sq daily for DVT proph    #2 Anemia-acute blood loss      monitor signs/sxs, may need PRBC tx, d/w Medicine      downward trend plats, observe on Lovenox    #3 hyponatremia       encourage po intake (DASH)       monitor off IV fluids       pain management    #4 urinary retention       would insert garcia with next st cath if no spontaneous void and volume >400       continue Flomax, TOV when more mobile, bowels regulated       avoid narcs as possible    #5 Hypothyroid      c/w Synthroid    #6 CAD stable #1 s/p left distal femur periprosth fx/ORIF POD #3     s/p left hand 5th metacarpal fx (non-surgical)     NWB LLE, dressing change later today, will allow gentle ROM left knee     Mobilize w PT as possible     Lovenox 30mg sq daily for DVT proph    #2 Anemia-acute blood loss      monitor signs/sxs, may need PRBC tx, d/w Medicine      downward trend plats, observe on Lovenox    #3 hyponatremia       encourage po intake (DASH)       monitor off IV fluids       pain management    #4 urinary retention       would insert garcia with next st cath if no spontaneous void and volume >400       continue Flomax, TOV when more mobile, bowels regulated       avoid narcs as possible    #5 Hypothyroid      c/w Synthroid    #6 CAD stable    ADDENDUM (16:30): left lower extremity dressing changed; telfa, combine and ace wrap reapplied; pt may leave knee immobilizer open when in bed to allow for gentle active ROM (discussed with pt's son @ bedside); sutures intact with scant bloody drainage; no erythema or ecchymosis presently; left heel with slight erythema without skin break; heel cups applied

## 2020-05-04 NOTE — PROGRESS NOTE ADULT - SUBJECTIVE AND OBJECTIVE BOX
STATUS POST:   left distal femur preiprosthetic fx/ORIF (COVID neg)    POST OPERATIVE DAY: #3     Vital Signs Last 24 Hrs  T(C): 37.3 (04 May 2020 06:45), Max: 37.3 (04 May 2020 06:45)  T(F): 99.1 (04 May 2020 06:45), Max: 99.1 (04 May 2020 06:45)  HR: 111 (04 May 2020 06:45) (72 - 111)  BP: 172/73 (04 May 2020 06:45) (118/82 - 172/73)  BP(mean): --  RR: 16 (04 May 2020 06:45) (12 - 26)  SpO2: 99% (04 May 2020 06:45) (99% - 100%)      SUBJECTIVE: Pt seen laying in bed, alert; c/o pain in left knee area as per RN and by pointing to left leg; has not required any analgesics to date; required st. cath x 3 yesterday and this am for volumes up to 1730ml and 1250 ml; now on Flomax; small BM formed this am; appreciate PT consult    General Appearance: petite elderly non-English speaking female appears comfortable @ present  HEENT: no pallor  Neck: Supple  Chest: decreased breath sounds bilat; no rales  CV: regular S1S2 @ 100 presently  Abdomen: +BS, soft, non-tender, non-distended  Extremities: LLE w ace wrap and knee immobilizer; dressing C&D; left thigh supple, non-tender, DP pulses intact, warm foot with good cap refill; able to wiggle toes, DF/PF on command; left hand with ace wrap; digits edematous but warm, pink w good cap refill; prox left upper extremity without edema or tenderness    I&O's Summary: reviewed    MEDICATIONS: noted    LABS:                        8.2    7.96  )-----------( 131      ( 04 May 2020 08:10 )             23.8     05-04    130<L>  |  97  |  11  ----------------------------<  124<H>  3.5   |  25  |  0.61    Ca    8.1<L>      04 May 2020 08:10    RADIOLOGY & ADDITIONAL STUDIES:

## 2020-05-05 LAB
ANION GAP SERPL CALC-SCNC: 4 MMOL/L — LOW (ref 5–17)
ANION GAP SERPL CALC-SCNC: 7 MMOL/L — SIGNIFICANT CHANGE UP (ref 5–17)
BLD GP AB SCN SERPL QL: SIGNIFICANT CHANGE UP
BUN SERPL-MCNC: 11 MG/DL — SIGNIFICANT CHANGE UP (ref 7–23)
BUN SERPL-MCNC: 12 MG/DL — SIGNIFICANT CHANGE UP (ref 7–23)
CALCIUM SERPL-MCNC: 8.4 MG/DL — SIGNIFICANT CHANGE UP (ref 8.4–10.5)
CALCIUM SERPL-MCNC: 8.5 MG/DL — SIGNIFICANT CHANGE UP (ref 8.4–10.5)
CHLORIDE SERPL-SCNC: 94 MMOL/L — LOW (ref 96–108)
CHLORIDE SERPL-SCNC: 95 MMOL/L — LOW (ref 96–108)
CO2 SERPL-SCNC: 27 MMOL/L — SIGNIFICANT CHANGE UP (ref 22–31)
CO2 SERPL-SCNC: 27 MMOL/L — SIGNIFICANT CHANGE UP (ref 22–31)
CREAT SERPL-MCNC: 0.65 MG/DL — SIGNIFICANT CHANGE UP (ref 0.5–1.3)
CREAT SERPL-MCNC: 0.68 MG/DL — SIGNIFICANT CHANGE UP (ref 0.5–1.3)
GLUCOSE SERPL-MCNC: 118 MG/DL — HIGH (ref 70–99)
GLUCOSE SERPL-MCNC: 88 MG/DL — SIGNIFICANT CHANGE UP (ref 70–99)
HCT VFR BLD CALC: 23.1 % — LOW (ref 34.5–45)
HCT VFR BLD CALC: 26.5 % — LOW (ref 34.5–45)
HGB BLD-MCNC: 7.9 G/DL — LOW (ref 11.5–15.5)
HGB BLD-MCNC: 9.2 G/DL — LOW (ref 11.5–15.5)
MCHC RBC-ENTMCNC: 32.9 PG — SIGNIFICANT CHANGE UP (ref 27–34)
MCHC RBC-ENTMCNC: 32.9 PG — SIGNIFICANT CHANGE UP (ref 27–34)
MCHC RBC-ENTMCNC: 34.2 GM/DL — SIGNIFICANT CHANGE UP (ref 32–36)
MCHC RBC-ENTMCNC: 34.7 GM/DL — SIGNIFICANT CHANGE UP (ref 32–36)
MCV RBC AUTO: 94.6 FL — SIGNIFICANT CHANGE UP (ref 80–100)
MCV RBC AUTO: 96.3 FL — SIGNIFICANT CHANGE UP (ref 80–100)
NRBC # BLD: 0 /100 WBCS — SIGNIFICANT CHANGE UP (ref 0–0)
NRBC # BLD: 0 /100 WBCS — SIGNIFICANT CHANGE UP (ref 0–0)
PLATELET # BLD AUTO: 156 K/UL — SIGNIFICANT CHANGE UP (ref 150–400)
PLATELET # BLD AUTO: 168 K/UL — SIGNIFICANT CHANGE UP (ref 150–400)
POTASSIUM SERPL-MCNC: 4.1 MMOL/L — SIGNIFICANT CHANGE UP (ref 3.5–5.3)
POTASSIUM SERPL-MCNC: 4.4 MMOL/L — SIGNIFICANT CHANGE UP (ref 3.5–5.3)
POTASSIUM SERPL-SCNC: 4.1 MMOL/L — SIGNIFICANT CHANGE UP (ref 3.5–5.3)
POTASSIUM SERPL-SCNC: 4.4 MMOL/L — SIGNIFICANT CHANGE UP (ref 3.5–5.3)
RBC # BLD: 2.4 M/UL — LOW (ref 3.8–5.2)
RBC # BLD: 2.8 M/UL — LOW (ref 3.8–5.2)
RBC # FLD: 12.7 % — SIGNIFICANT CHANGE UP (ref 10.3–14.5)
RBC # FLD: 13 % — SIGNIFICANT CHANGE UP (ref 10.3–14.5)
SODIUM SERPL-SCNC: 125 MMOL/L — LOW (ref 135–145)
SODIUM SERPL-SCNC: 129 MMOL/L — LOW (ref 135–145)
WBC # BLD: 8.93 K/UL — SIGNIFICANT CHANGE UP (ref 3.8–10.5)
WBC # BLD: 9.28 K/UL — SIGNIFICANT CHANGE UP (ref 3.8–10.5)
WBC # FLD AUTO: 8.93 K/UL — SIGNIFICANT CHANGE UP (ref 3.8–10.5)
WBC # FLD AUTO: 9.28 K/UL — SIGNIFICANT CHANGE UP (ref 3.8–10.5)

## 2020-05-05 RX ORDER — SENNA PLUS 8.6 MG/1
1 TABLET ORAL
Qty: 0 | Refills: 0 | DISCHARGE
Start: 2020-05-05

## 2020-05-05 RX ORDER — POLYETHYLENE GLYCOL 3350 17 G/17G
17 POWDER, FOR SOLUTION ORAL
Qty: 0 | Refills: 0 | DISCHARGE
Start: 2020-05-05

## 2020-05-05 RX ORDER — FUROSEMIDE 40 MG
1 TABLET ORAL
Qty: 0 | Refills: 0 | DISCHARGE

## 2020-05-05 RX ADMIN — Medication 25 MICROGRAM(S): at 05:39

## 2020-05-05 RX ADMIN — TAMSULOSIN HYDROCHLORIDE 0.4 MILLIGRAM(S): 0.4 CAPSULE ORAL at 21:21

## 2020-05-05 RX ADMIN — Medication 650 MILLIGRAM(S): at 21:21

## 2020-05-05 RX ADMIN — PANTOPRAZOLE SODIUM 40 MILLIGRAM(S): 20 TABLET, DELAYED RELEASE ORAL at 05:39

## 2020-05-05 NOTE — PROGRESS NOTE ADULT - ASSESSMENT
89 female with hx of cad/nstemi, ?ipf/chf (not formerly diagnosed with either but takes lasix once a week), hypothyroid, urinary retention presents s/p mechanical fall found with left distal femur fracture     Able to urinate overnight, + BM this am    # Periprosthetic supracondylar fracture left femur s/p orif pod # 4  ortho following - left distal femur fx s/p orif pod # 4  appears stable - pain management, pt evaluation, tolerating diet, minimal BM since surgery, add Miralax  Mobilize with PT, Allow for gentle ROM of left knee   Commode for bathroom. Family state patient normally must sit on toilet for 20 min to urinate.    follow up ortho recs    # Closed non displaced fx of fifth metacarpal bone of left hand  conservative management, volar splint, rest, ice, elevation    # Acute blood loss anemia  likely post op blood loss hgb 11.5, 8.9, 8.3, 8.2 7.9  intermittent tachycardia resolved  Will give 1 unit PRBC as per surgery recs. Family present and agrees.   continue to monitor cbc    #Hyponatremia   -Na+ 130  -Encourage PO intake  -follow up BMP in am    #Urinary retention  -Resolved. Pt able to urinate well after BM     # Hypothyroid   continue synthroid    # DVT ppx  renally dosed enoxaparin will continue as per surgery, then ASA 81mg bid for 6-8 weeks     # Discharge planning  Plan for rehab facility after PRBC. Repeat CBC should be followed up tomorrow at facility.     Multiple updates made with family (Dr. Wilson and Dr. Garvey) throughout the day.  Aware and agree with plan 89 female with hx of cad/nstemi, ?ipf/chf (not formerly diagnosed with either but takes lasix once a week), hypothyroid, urinary retention presents s/p mechanical fall found with left distal femur fracture     Able to urinate overnight, + BM this am    Update (s/p PRBC): Hgb 9.2, Na+ 125 (Will fluid restrict 1L, Recheck BMP in am, if lower will give Lasix 20mg IV.     Pending discharge to Acute Rehab at [facility] tomorrow    # Periprosthetic supracondylar fracture left femur s/p orif pod # 4  ortho following - left distal femur fx s/p orif pod # 4  appears stable - pain management, pt evaluation, tolerating diet, minimal BM since surgery, add Miralax  Mobilize with PT, Allow for gentle ROM of left knee   Commode for bathroom. Family state patient normally must sit on toilet for 20 min to urinate.    follow up ortho recs    # Closed non displaced fx of fifth metacarpal bone of left hand  conservative management, volar splint, rest, ice, elevation    # Acute blood loss anemia  likely post op blood loss hgb 11.5, 8.9, 8.3, 8.2 7.9  intermittent tachycardia resolved  Will give 1 unit PRBC as per surgery recs. Family present and agrees.   continue to monitor cbc    #Hyponatremia   -Na+ 130  -Encourage PO intake  -follow up BMP in am  Update @1730 Na+ 125 (Will fluid restrict 1L, Recheck BMP in am, if lower will give Lasix 20mg IV.       #Urinary retention  -Resolved. Pt able to urinate well after BM     # Hypothyroid   continue synthroid    # DVT ppx  renally dosed enoxaparin will continue as per surgery, then ASA 81mg bid for 6-8 weeks     # Discharge planning  Plan for rehab facility after PRBC. Repeat CBC should be followed up tomorrow at facility.     Multiple updates made with family (Dr. Wilson and Dr. Garvey) throughout the day.  Aware and agree with plan

## 2020-05-05 NOTE — PROGRESS NOTE ADULT - ASSESSMENT
Patient is a 89 yo female POD # 4 s/p ORIF left distal femur  no events noted over night     # s/p left distal femur periprosth fx/ORIF POD #4     s/p left hand 5th metacarpal fx (non-surgical, pt with splint)     NWB LLE, will allow gentle ROM left knee     Mobilize w/PT as possible     Lovenox 30mg sq daily for DVT proph    # Anemia-acute blood loss      Hg 7.9, will transfuse 1 U PRBCs today,son agrees      trend cbc    # hyponatremia       encourage po intake (DASH)       monitor off IV fluids       pain management        f/u am bmp    #4 urinary retention       resolved s/p BM     #5 Hypothyroid      c/w Synthroid    #6 CAD stable    pt may leave knee immobilizer open when in bed to allow for gentle active ROM (discussed with pt's son @ bedside)    Pt planned to [facility] when Hg stable Patient is a 89 yo female POD # 4 s/p ORIF left distal femur  no events noted over night     # s/p left distal femur periprosth fx/ORIF POD #4     s/p left hand 5th metacarpal fx (non-surgical, pt with splint)     NWB LLE, will allow gentle ROM left knee     Mobilize w/PT as possible     Lovenox 30mg sq daily for DVT proph while inpatient, then ASA 81 mg q 12 hours for 6-8 weeks as op    # Anemia-acute blood loss      Hg 7.9, will transfuse 1 U PRBCs today, son agrees      trend H/H     # hyponatremia       encourage po intake (DASH)       monitor off IV fluids       pain management        f/u am bmp    #4 urinary retention       resolved s/p BM     #5 Hypothyroid      c/w Synthroid    #6 CAD stable    pt may leave knee immobilizer open when in bed to allow for gentle active ROM (discussed with pt's son @ bedside)    Pt planned to [facility] when Hg stable

## 2020-05-05 NOTE — PROGRESS NOTE PEDS - SUBJECTIVE AND OBJECTIVE BOX
Patient is a 89y old  Female who presents with a chief complaint of s/p L periprosthetic knee fx distal femur. (05 May 2020 12:20)      Patient seen and examined at bedside. Resting comfortably, Able to urinate on commode overnight. Large BM this am. No reports of dark stool.       No Known Allergies    MEDICATIONS  (STANDING):  enoxaparin Injectable 30 milliGRAM(s) SubCutaneous daily  levothyroxine 25 MICROGram(s) Oral daily  pantoprazole    Tablet 40 milliGRAM(s) Oral before breakfast  senna 1 Tablet(s) Oral at bedtime  tamsulosin 0.4 milliGRAM(s) Oral at bedtime    MEDICATIONS  (PRN):  acetaminophen   Tablet .. 650 milliGRAM(s) Oral every 6 hours PRN Temp greater or equal to 38C (100.4F), Mild Pain (1 - 3)  HYDROmorphone  Injectable 0.25 milliGRAM(s) IV Push every 3 hours PRN Severe Pain (7 - 10)  ondansetron Injectable 4 milliGRAM(s) IV Push every 6 hours PRN Nausea and/or Vomiting  oxyCODONE    IR 5 milliGRAM(s) Oral every 3 hours PRN Moderate Pain (4 - 6)  polyethylene glycol 3350 17 Gram(s) Oral daily PRN Constipation    Vital Signs Last 24 Hrs  T(F): 98.9 (04 May 2020 09:30), Max: 99.1 (04 May 2020 06:45)  HR: 106 (04 May 2020 09:30) (72 - 111)  BP: 134/81 (04 May 2020 09:30) (118/82 - 172/73)  RR: 18 (04 May 2020 09:30) (12 - 18)  SpO2: 98% (04 May 2020 09:30) (98% - 100%)  I&O's Summary    03 May 2020 07:01  -  04 May 2020 07:00  --------------------------------------------------------  IN: 330 mL / OUT: 1880 mL / NET: -1550 mL    04 May 2020 07:01  -  04 May 2020 14:25  --------------------------------------------------------  IN: 360 mL / OUT: 0 mL / NET: 360 mL      PHYSICAL EXAM:  General: NAD, mildly lethargic A/O x 3  ENT: MMM  Neck: Supple, No JVD  Lungs: Clear to auscultation bilaterally, respirations even and unlabored.   Cardio:  intermittently tachycardic, currently rate of 96,  S1/S2, + murmur  Abdomen: Soft, Nontender, Nondistended; Bowel sounds present  Extremities: Left leg in knee immobilizer, No calf tenderness, No pitting edema, equal pulses to lower extremities, good cap refill. Able to move all extremities.     LABS:                        8.2    7.96  )-----------( 131      ( 04 May 2020 08:10 )             23.8     05-04    130  |  97  |  11  ----------------------------<  124  3.5   |  25  |  0.61    Ca    8.1      04 May 2020 08:10    TPro  5.8  /  Alb  2.7  /  TBili  0.5  /  DBili  x   /  AST  24  /  ALT  9   /  AlkPhos  52  05-02    eGFR if Non African American: 77 mL/min/1.73M2 (05-04-20 @ 08:10)  eGFR if : 89 mL/min/1.73M2 (05-04-20 @ 08:10)        RADIOLOGY & ADDITIONAL TESTS:    Care Discussed with Consultants/Other Providers:

## 2020-05-05 NOTE — DISCHARGE NOTE PROVIDER - NSDCCPCAREPLAN_GEN_ALL_CORE_FT
PRINCIPAL DISCHARGE DIAGNOSIS  Diagnosis: Periprosth fracture around unsp internal prosth joint, init  Assessment and Plan of Treatment:       SECONDARY DISCHARGE DIAGNOSES  Diagnosis: Hyponatremia  Assessment and Plan of Treatment:     Diagnosis: Acute blood loss anemia  Assessment and Plan of Treatment:     Diagnosis: Femur fracture, left  Assessment and Plan of Treatment:

## 2020-05-05 NOTE — DISCHARGE NOTE PROVIDER - NSDCFUADDINST_GEN_ALL_CORE_FT
May remove immobilizer while in bed for comfort .  PT may begin gentle flexing as tolerated.  Strict non weight bearing left leg - suggest wheel chair transfers - Patient not strong enough to use walker.   Elevate left hand - splint applied for left 5th metacarpal fracture.  Flex /extend fingers to   maintain ROM.

## 2020-05-05 NOTE — DISCHARGE NOTE PROVIDER - NSDCMRMEDTOKEN_GEN_ALL_CORE_FT
Flomax 0.4 mg oral capsule: 1 cap(s) orally once a day  polyethylene glycol 3350 oral powder for reconstitution: 17 gram(s) orally once a day, As needed, Constipation  senna oral tablet: 1 tab(s) orally once a day (at bedtime)  Synthroid 25 mcg (0.025 mg) oral tablet: 1 tab(s) orally once a day enoxaparin: 30 milligram(s) subcutaneous once a day  continue for 9 more days   Flomax 0.4 mg oral capsule: 1 cap(s) orally once a day  polyethylene glycol 3350 oral powder for reconstitution: 17 gram(s) orally once a day, As needed, Constipation  senna oral tablet: 1 tab(s) orally once a day (at bedtime)  Synthroid 25 mcg (0.025 mg) oral tablet: 1 tab(s) orally once a day

## 2020-05-05 NOTE — PROGRESS NOTE ADULT - SUBJECTIVE AND OBJECTIVE BOX
Patient is a 89 yo female who presents with a chief complaint of s/p L periprosthetic knee fx distal femur (04 May 2020 14:24)  POD # 4 s/p ORIF left distal femur  no events noted over night, pt had BM   tachycardia resolved, pt appears lethargic this am     Patient seen and examined at bedside, son at bedside    ALLERGIES:  No Known Allergies    MEDICATIONS  (STANDING):  enoxaparin Injectable 30 milliGRAM(s) SubCutaneous daily  levothyroxine 25 MICROGram(s) Oral daily  pantoprazole    Tablet 40 milliGRAM(s) Oral before breakfast  senna 1 Tablet(s) Oral at bedtime  tamsulosin 0.4 milliGRAM(s) Oral at bedtime    MEDICATIONS  (PRN):  acetaminophen   Tablet .. 650 milliGRAM(s) Oral every 6 hours PRN Temp greater or equal to 38C (100.4F), Mild Pain (1 - 3)  HYDROmorphone  Injectable 0.25 milliGRAM(s) IV Push every 3 hours PRN Severe Pain (7 - 10)  ondansetron Injectable 4 milliGRAM(s) IV Push every 6 hours PRN Nausea and/or Vomiting  oxyCODONE    IR 5 milliGRAM(s) Oral every 3 hours PRN Moderate Pain (4 - 6)  polyethylene glycol 3350 17 Gram(s) Oral daily PRN Constipation  psyllium Powder 1 Packet(s) Oral daily PRN constipation    Vital Signs Last 24 Hrs  T(F): 97.7 (05 May 2020 11:10), Max: 98.8 (04 May 2020 13:30)  HR: 74 (05 May 2020 11:40) (74 - 104)  BP: 106/87 (05 May 2020 11:40) (89/57 - 132/68)  RR: 14 (05 May 2020 11:40) (14 - 18)  SpO2: 98% (05 May 2020 11:10) (95% - 100%)  I&O's Summary    04 May 2020 07:01  -  05 May 2020 07:00  --------------------------------------------------------  IN: 900 mL / OUT: 550 mL / NET: 350 mL    05 May 2020 07:01  -  05 May 2020 12:20  --------------------------------------------------------  IN: 175 mL / OUT: 0 mL / NET: 175 mL    PHYSICAL EXAM:  General: NAD, A/O x 3, pt is lethargic this am as compared to yesterday   ENT: MMM  Neck: Supple, No JVD  Lungs: Clear to auscultation bilaterally  Cardio: RRR, S1/S2, No murmurs  Abdomen: Soft, Nontender, Nondistended; Bowel sounds present  Extremities: LUE in splint, fingers slightly swollen but pt able to move them well, skin is warm and dry  thigh supple, incision c/d/i, No calf tenderness, No pitting edema, EHL/DHL 5/5     LABS:                        7.9    8.93  )-----------( 156      ( 05 May 2020 06:41 )             23.1     05-05    129  |  95  |  11  ----------------------------<  118  4.1   |  27  |  0.65    Ca    8.5      05 May 2020 06:41      eGFR if Non African American: 75 mL/min/1.73M2 (05-05-20 @ 06:41)  eGFR if : 87 mL/min/1.73M2 (05-05-20 @ 06:41)

## 2020-05-05 NOTE — DISCHARGE NOTE PROVIDER - CARE PROVIDERS DIRECT ADDRESSES
,DirectAddress_Unknown ,DirectAddress_Unknown,[email or web address]

## 2020-05-05 NOTE — PROGRESS NOTE PEDS - ASSESSMENT
89 female with hx of cad/nstemi, ?ipf/chf (not formerly diagnosed with either but takes lasix once a week), hypothyroid, urinary retention presents s/p mechanical fall found with left distal femur fracture     Able to urinate overnight, + BM this am    # Periprosthetic supracondylar fracture left femur s/p orif pod # 4  ortho following - left distal femur fx s/p orif pod # 4  appears stable - pain management, pt evaluation, tolerating diet, minimal BM since surgery, add Miralax  Mobilize with PT, Allow for gentle ROM of left knee   Commode for bathroom. Family state patient normally must sit on toilet for 20 min to urinate.    follow up ortho recs    # Closed non displaced fx of fifth metacarpal bone of left hand  conservative management, volar splint, rest, ice, elevation    # Acute blood loss anemia  likely post op blood loss hgb 11.5, 8.9, 8.3, 8.2 7.9  intermittent tachycardia resolved  Will give 1 unit PRBC as per surgery recs. Family present and agrees.   continue to monitor cbc    #Hyponatremia   -Na+ 130  -Encourage PO intake  -follow up BMP in am    #Urinary retention  -Resolved. Pt able to urinate well after BM     # Hypothyroid   continue synthroid    # DVT ppx  renally dosed enoxaparin will continue as per surgery, then ASA 81mg bid for 6-8 weeks     # Discharge planning  Plan for rehab facility after PRBC. Repeat CBC should be followed up tomorrow at facility.     Multiple updates made with family (Dr. Wilson and Dr. Garvey) throughout the day.  Aware and agree with plan

## 2020-05-05 NOTE — DISCHARGE NOTE PROVIDER - CARE PROVIDER_API CALL
paramjit rahman  Phone: [phone]  Fax: (   )    -  Follow Up Time: paramjit rahman  Phone: [phone]  Fax: (   )    -  Follow Up Time:     Kris Hernandez)  Orthopaedic Sports Medicine; Orthopaedic Surgery  [address] [place] [address]  Phone: [phone]  Fax: [phone]  Follow Up Time: 2 weeks

## 2020-05-05 NOTE — DISCHARGE NOTE PROVIDER - NSDCCPGOAL_GEN_ALL_CORE_FT
To get better and follow your care plan as instructed. You had surgery for a fracture to your left leg on 5/1/20. You experienced decreasing blood levels after your surgery and required 1 unit of blood. Your sodium has also been low. Follow up with your doctors at acute rehab.

## 2020-05-05 NOTE — DISCHARGE NOTE PROVIDER - NSDCCPTREATMENT_GEN_ALL_CORE_FT
PRINCIPAL PROCEDURE  Procedure: ORIF fracture of left distal femur  Findings and Treatment: periprosthetic fracture

## 2020-05-05 NOTE — DISCHARGE NOTE PROVIDER - HOSPITAL COURSE
89F hx of CAD/NSTEMI, ?IPF/CHF (not formerly dx with either but takes Lasix once a week), hypothyroid, urinary retention pw s/p mechanical fall from bed. No antecedent sxs of HA, dizziness, SOB, CP. No recent fevers, chills, cough, NVD. Xray with distal femur fx above the L prosthetic knee. Pt is s/p ORIF periprosthetic supracondylar fracture left femur on 5/1.  Knee immobilizer placed after surgery. No circulation issues noted. Pt experienced acute blood loss secondary to surgery which decreased each day from 11.5 -->7.9. On 5/5, the patient was transfused with 1 unit of PRBC. Course also complicated with hyponatremia and urinary retention. Na+ had been down trending from 131--> 125. Fluids restricted. Patient needed straight caths the night of 5/3, possibly secondary to constipation. After a large BM patient was unable to void without cath. Physical therapy has been ambulating patient from bed to commode or wheelchair. As per family patient is to be discharged to acute rehab at [facility]. 89F hx of CAD/NSTEMI, ?IPF/CHF (not formerly dx with either but takes Lasix once a week), hypothyroid, urinary retention pw s/p mechanical fall from bed. No antecedent sxs of HA, dizziness, SOB, CP. No recent fevers, chills, cough, NVD. Xray with distal femur fx above the L prosthetic knee. Pt is s/p ORIF periprosthetic supracondylar fracture left femur on 5/1.  Knee immobilizer placed after surgery. No circulation issues noted. Pt experienced acute blood loss secondary to surgery which decreased each day from 11.5 -->7.9. No black stools or hematemasis. On 5/5, the patient was transfused with 1 unit of PRBC. Course also complicated with hyponatremia and urinary retention. Na+ had been down trending from 131--> 125. Fluids restricted. Patient needed straight caths the night of 5/3, possibly secondary to constipation. After a large BM patient was unable to void without cath. Physical therapy has been ambulating patient from bed to commode or wheelchair. As per family patient is to be discharged to acute rehab at [facility]. 89F hx of CAD/NSTEMI, ?IPF/CHF (not formerly dx with either but takes Lasix once a week), hypothyroid, urinary retention pw s/p mechanical fall from bed. No antecedent sxs of HA, dizziness, SOB, CP. No recent fevers, chills, cough, NVD. Xray with distal femur fx above the L prosthetic knee. Pt is s/p ORIF periprosthetic supracondylar fracture left femur on 5/1.  Knee immobilizer placed after surgery. No circulation issues noted. Pt experienced acute blood loss secondary to surgery which decreased each day from 11.5 -->7.9. No black stools or hematemasis. On 5/5, the patient was transfused with 1 unit of PRBC. Course also complicated with hyponatremia and urinary retention. Na+ had been down trending from 131--> 125. Fluids restricted. Patient needed straight caths the night of 5/3, possibly secondary to constipation. After a large BM patient was able  to void without cath. Physical therapy has been ambulating patient from bed to commode or wheelchair.  Patient will be non weight bearing on the left leg for approximately 2 months.   As per family,  patient is to be discharged to acute rehab at [facility].  She  will follow up with Dr Hernandez/Xena in two weeks for suture removal .    Patient will  remain NWB left leg. Wheelchair transfers only or walker if patient able and strong enough.   Hyponatremia to be followed daily until Na at normal level.   Patient is being fluid restricted presently.           *  When Lovenox course completed for 14 days post op , patient to be started on Ecotrin 81 mg po bid for a period of 4-6 weeks for DVT prophylaxis .

## 2020-05-05 NOTE — DISCHARGE NOTE PROVIDER - PROVIDER TOKENS
FREE:[LAST:[rahman],FIRST:[paramjit],PHONE:[phone]],FAX:[(   )    -]] FREE:[LAST:[rahman],FIRST:[viplov],PHONE:[phone]],FAX:[(   )    -]],PROVIDER:[TOKEN:[[id number]],FOLLOWUP:[2 weeks]]

## 2020-05-05 NOTE — DISCHARGE NOTE PROVIDER - NSDCFUADDAPPT_GEN_ALL_CORE_FT
Follow up with Dr Hernandez or Dr Mccallum in 2 weeks for   suture removal and to evaluate left hand metacarpal fx - replace splint

## 2020-05-06 VITALS
SYSTOLIC BLOOD PRESSURE: 115 MMHG | TEMPERATURE: 98 F | HEART RATE: 86 BPM | RESPIRATION RATE: 16 BRPM | OXYGEN SATURATION: 99 % | DIASTOLIC BLOOD PRESSURE: 80 MMHG

## 2020-05-06 LAB
ANION GAP SERPL CALC-SCNC: 5 MMOL/L — SIGNIFICANT CHANGE UP (ref 5–17)
BUN SERPL-MCNC: 10 MG/DL — SIGNIFICANT CHANGE UP (ref 7–23)
CALCIUM SERPL-MCNC: 8 MG/DL — LOW (ref 8.4–10.5)
CHLORIDE SERPL-SCNC: 95 MMOL/L — LOW (ref 96–108)
CO2 SERPL-SCNC: 28 MMOL/L — SIGNIFICANT CHANGE UP (ref 22–31)
CREAT SERPL-MCNC: 0.71 MG/DL — SIGNIFICANT CHANGE UP (ref 0.5–1.3)
GLUCOSE SERPL-MCNC: 88 MG/DL — SIGNIFICANT CHANGE UP (ref 70–99)
HCT VFR BLD CALC: 25.9 % — LOW (ref 34.5–45)
HGB BLD-MCNC: 8.9 G/DL — LOW (ref 11.5–15.5)
MCHC RBC-ENTMCNC: 32.6 PG — SIGNIFICANT CHANGE UP (ref 27–34)
MCHC RBC-ENTMCNC: 34.4 GM/DL — SIGNIFICANT CHANGE UP (ref 32–36)
MCV RBC AUTO: 94.9 FL — SIGNIFICANT CHANGE UP (ref 80–100)
NRBC # BLD: 0 /100 WBCS — SIGNIFICANT CHANGE UP (ref 0–0)
PLATELET # BLD AUTO: 167 K/UL — SIGNIFICANT CHANGE UP (ref 150–400)
POTASSIUM SERPL-MCNC: 4.3 MMOL/L — SIGNIFICANT CHANGE UP (ref 3.5–5.3)
POTASSIUM SERPL-SCNC: 4.3 MMOL/L — SIGNIFICANT CHANGE UP (ref 3.5–5.3)
RBC # BLD: 2.73 M/UL — LOW (ref 3.8–5.2)
RBC # FLD: 13.2 % — SIGNIFICANT CHANGE UP (ref 10.3–14.5)
SODIUM SERPL-SCNC: 128 MMOL/L — LOW (ref 135–145)
WBC # BLD: 6.35 K/UL — SIGNIFICANT CHANGE UP (ref 3.8–10.5)
WBC # FLD AUTO: 6.35 K/UL — SIGNIFICANT CHANGE UP (ref 3.8–10.5)

## 2020-05-06 PROCEDURE — 86901 BLOOD TYPING SEROLOGIC RH(D): CPT

## 2020-05-06 PROCEDURE — C1889: CPT

## 2020-05-06 PROCEDURE — 80048 BASIC METABOLIC PNL TOTAL CA: CPT

## 2020-05-06 PROCEDURE — 87635 SARS-COV-2 COVID-19 AMP PRB: CPT

## 2020-05-06 PROCEDURE — 71045 X-RAY EXAM CHEST 1 VIEW: CPT

## 2020-05-06 PROCEDURE — 80053 COMPREHEN METABOLIC PANEL: CPT

## 2020-05-06 PROCEDURE — 99233 SBSQ HOSP IP/OBS HIGH 50: CPT

## 2020-05-06 PROCEDURE — 36430 TRANSFUSION BLD/BLD COMPNT: CPT

## 2020-05-06 PROCEDURE — 97162 PT EVAL MOD COMPLEX 30 MIN: CPT

## 2020-05-06 PROCEDURE — 76000 FLUOROSCOPY <1 HR PHYS/QHP: CPT

## 2020-05-06 PROCEDURE — C1713: CPT

## 2020-05-06 PROCEDURE — 36415 COLL VENOUS BLD VENIPUNCTURE: CPT

## 2020-05-06 PROCEDURE — 73110 X-RAY EXAM OF WRIST: CPT

## 2020-05-06 PROCEDURE — 73552 X-RAY EXAM OF FEMUR 2/>: CPT

## 2020-05-06 PROCEDURE — 85027 COMPLETE CBC AUTOMATED: CPT

## 2020-05-06 PROCEDURE — 85730 THROMBOPLASTIN TIME PARTIAL: CPT

## 2020-05-06 PROCEDURE — C1769: CPT

## 2020-05-06 PROCEDURE — 99285 EMERGENCY DEPT VISIT HI MDM: CPT | Mod: 25

## 2020-05-06 PROCEDURE — 85610 PROTHROMBIN TIME: CPT

## 2020-05-06 PROCEDURE — 86850 RBC ANTIBODY SCREEN: CPT

## 2020-05-06 PROCEDURE — 93005 ELECTROCARDIOGRAM TRACING: CPT

## 2020-05-06 PROCEDURE — 96374 THER/PROPH/DIAG INJ IV PUSH: CPT

## 2020-05-06 PROCEDURE — 97530 THERAPEUTIC ACTIVITIES: CPT

## 2020-05-06 PROCEDURE — 86923 COMPATIBILITY TEST ELECTRIC: CPT

## 2020-05-06 PROCEDURE — 73562 X-RAY EXAM OF KNEE 3: CPT

## 2020-05-06 PROCEDURE — P9016: CPT

## 2020-05-06 PROCEDURE — 86900 BLOOD TYPING SEROLOGIC ABO: CPT

## 2020-05-06 PROCEDURE — 72170 X-RAY EXAM OF PELVIS: CPT

## 2020-05-06 RX ORDER — ENOXAPARIN SODIUM 100 MG/ML
30 INJECTION SUBCUTANEOUS
Qty: 0 | Refills: 0 | DISCHARGE
Start: 2020-05-06

## 2020-05-06 RX ADMIN — PANTOPRAZOLE SODIUM 40 MILLIGRAM(S): 20 TABLET, DELAYED RELEASE ORAL at 06:31

## 2020-05-06 RX ADMIN — Medication 25 MICROGRAM(S): at 06:31

## 2020-05-06 NOTE — PROGRESS NOTE ADULT - SUBJECTIVE AND OBJECTIVE BOX
Patient is a 89y old  Female who presents with a chief complaint of s/p L periprosthetic knee fx distal femur. (05 May 2020 18:42)    Patient seen and examined at bedside.  no acute events overnight    ALLERGIES:  No Known Allergies        Vital Signs Last 24 Hrs  T(F): 98.1 (06 May 2020 06:32), Max: 98.5 (05 May 2020 16:15)  HR: 72 (06 May 2020 06:32) (72 - 83)  BP: 123/67 (06 May 2020 06:32) (89/57 - 124/86)  RR: 18 (06 May 2020 06:32) (14 - 18)  SpO2: 98% (06 May 2020 06:32) (96% - 100%)  I&O's Summary    05 May 2020 07:01  -  06 May 2020 07:00  --------------------------------------------------------  IN: 815 mL / OUT: 500 mL / NET: 315 mL      MEDICATIONS:  acetaminophen   Tablet .. 650 milliGRAM(s) Oral every 6 hours PRN  enoxaparin Injectable 30 milliGRAM(s) SubCutaneous daily  HYDROmorphone  Injectable 0.25 milliGRAM(s) IV Push every 3 hours PRN  levothyroxine 25 MICROGram(s) Oral daily  ondansetron Injectable 4 milliGRAM(s) IV Push every 6 hours PRN  oxyCODONE    IR 5 milliGRAM(s) Oral every 3 hours PRN  pantoprazole    Tablet 40 milliGRAM(s) Oral before breakfast  polyethylene glycol 3350 17 Gram(s) Oral daily PRN  psyllium Powder 1 Packet(s) Oral daily PRN  senna 1 Tablet(s) Oral at bedtime  tamsulosin 0.4 milliGRAM(s) Oral at bedtime      PHYSICAL EXAM:  General: NAD, Alert  ENT: MMM  Neck: Supple, No JVD  Lungs: Clear to auscultation bilaterally, non labored breathing  Cardio: S1S2 regular  Abdomen: Soft, Nontender  Extremities: No cyanosis, No edema    LABS:                        8.9    6.35  )-----------( 167      ( 06 May 2020 06:32 )             25.9     05-06    128  |  95  |  10  ----------------------------<  88  4.3   |  28  |  0.71    Ca    8.0      06 May 2020 06:32      eGFR if Non African American: 72 mL/min/1.73M2 (05-06-20 @ 06:32)  eGFR if : 84 mL/min/1.73M2 (05-06-20 @ 06:32)                                    RADIOLOGY & ADDITIONAL TESTS:    Care Discussed with Consultants/Other Providers:

## 2020-05-06 NOTE — PROGRESS NOTE ADULT - ASSESSMENT
89 female with hx of cad/nstemi, ?ipf/chf (not formerly diagnosed with either but takes lasix once a week), hypothyroid, urinary retention presents s/p mechanical fall found with left distal femur fracture     # Periprosthetic supracondylar fracture left femur s/o orif pod # 5  ortho following - dc planning rehab at [facility]    # Closed non displaced fx of fith metacrapl bone of left hand  conservative managment, volar splint, rest, ice, elevation    # Acute blood loss anemia  post op blood loss s/p one unit prbc's  appears stable - no black stools or hematemesis    # Hyponatremia  fluids restricted, improved today    # Urinary Retention  pt able to void without cath    # Hypothyroid  continue synthroid    # DVT ppx  renally dosed enoxaparin, asa therapy as per ortho recs    # Discharge planning  plan for dc to acute rehab at [facility]

## 2020-05-06 NOTE — DISCHARGE NOTE NURSING/CASE MANAGEMENT/SOCIAL WORK - PATIENT PORTAL LINK FT
You can access the FollowMyHealth Patient Portal offered by [facility] by registering at the following website: [email or web address]. By joining [email or web address] FollowMyHealth portal, you will also be able to view your health information using other applications (apps) compatible with our system.

## 2022-04-10 NOTE — PHYSICAL THERAPY INITIAL EVALUATION ADULT - FOLLOWS COMMANDS/ANSWERS QUESTIONS, REHAB EVAL
PRINCIPAL DISCHARGE DIAGNOSIS  Diagnosis: Acute UTI  Assessment and Plan of Treatment: Complete antibiotics course      SECONDARY DISCHARGE DIAGNOSES  Diagnosis: Kidney stone  Assessment and Plan of Treatment: Follow up with Dr. Gallego in office for stent and stone management    
100% of the time

## 2023-11-29 NOTE — PROVIDER CONTACT NOTE (OTHER) - ACTION/TREATMENT ORDERED:
New rx request for   -ACCU CHEK SOFTCLIX LANCETS (100)  -BD ALCOHOL SWAB PAD  -ACCU CHEK ANTONIO CONTROL SOL (LEVEL 1 AND 2)  -tiZANidine (ZANAFLEX) 2 MG tablet  -nystatin 689334 UNIT/GM powder    Please send to [address] [phone] -  [phone]
Provider made aware. No orders received at this time. Will continue to monitor.
Provider made aware. No orders received at this time. Will continue to monitor.
monitor Is and Os.
straight cath now